# Patient Record
Sex: FEMALE | Race: WHITE | Employment: OTHER | URBAN - METROPOLITAN AREA
[De-identification: names, ages, dates, MRNs, and addresses within clinical notes are randomized per-mention and may not be internally consistent; named-entity substitution may affect disease eponyms.]

---

## 2021-02-02 DIAGNOSIS — Z11.59 ENCOUNTER FOR SCREENING FOR OTHER VIRAL DISEASES: Primary | ICD-10-CM

## 2021-03-11 ENCOUNTER — NURSE TRIAGE (OUTPATIENT)
Dept: NURSING | Facility: CLINIC | Age: 76
End: 2021-03-11

## 2021-03-11 NOTE — TELEPHONE ENCOUNTER
RN triage   Call from pt   Pt having hip surgery 3/19 and needs covid test before surgery   Pt wants covid test to be done at home clinic in [place]  Reviewed process -- to get covid test ordered and done at home clinic and ascertaining results are communicated to [facility] prior to surgery - or get test done at [facility]   Pt will decide where she wants test done and carrie back   Khloe Burdick RN  BAN  Triage Nurse Advisor    COVID 19 Nurse Triage Plan/Patient Instructions    Please be aware that novel coronavirus (COVID-19) may be circulating in the community. If you develop symptoms such as fever, cough, or SOB or if you have concerns about the presence of another infection including coronavirus (COVID-19), please contact your health care provider or visit [email or web address].     Disposition/Instructions    Home care recommended. Follow home care protocol based instructions.    Thank you for taking steps to prevent the spread of this virus.  o Limit your contact with others.  o Wear a simple mask to cover your cough.  o Wash your hands well and often.    Resources    [facility]: About COVID-19: [email or web address]    CDC: What to Do If You're Sick: www.cdc.gov/coronavirus/2019-ncov/about/steps-when-sick.html    CDC: Ending Home Isolation: www.cdc.gov/coronavirus/2019-ncov/hcp/disposition-in-home-patients.html     CDC: Caring for Someone: www.cdc.gov/coronavirus/2019-ncov/if-you-are-sick/care-for-someone.html     [facility]: Interim Guidance for Hospital Discharge to Home: www.health.state.[place]/diseases/coronavirus/hcp/hospdischarge.pdf    [facility] clinical trials (COVID-19 research studies): [email or web address]-[email or web address]     Below are the COVID-19 hotlines at the [facility] of [facility] ([facility]). Interpreters are available.   o For health questions: Call [phone] or [phone] (7 a.m. to 7 p.m.)  o For questions about schools and  childcare: Call [phone] or [phone] (7 a.m. to 7 p.m.)                     Reason for Disposition    COVID-19 Testing, questions about    Protocols used: CORONAVIRUS (COVID-19) DIAGNOSED OR [id number]-H-AZ 1.3

## 2021-03-18 RX ORDER — ASPIRIN 81 MG/1
81 TABLET ORAL DAILY
Status: ON HOLD | COMMUNITY
End: 2021-03-20

## 2021-03-18 RX ORDER — FLUOXETINE 40 MG/1
40 CAPSULE ORAL DAILY
COMMUNITY

## 2021-03-18 RX ORDER — BUSPIRONE HYDROCHLORIDE 7.5 MG/1
7.5 TABLET ORAL EVERY MORNING
COMMUNITY

## 2021-03-18 RX ORDER — SIMVASTATIN 20 MG
20 TABLET ORAL AT BEDTIME
COMMUNITY

## 2021-03-18 RX ORDER — TRAZODONE HYDROCHLORIDE 50 MG/1
50 TABLET, FILM COATED ORAL AT BEDTIME
COMMUNITY

## 2021-03-18 RX ORDER — IBUPROFEN 200 MG
200 TABLET ORAL EVERY 4 HOURS PRN
Status: ON HOLD | COMMUNITY
End: 2021-03-20

## 2021-03-18 RX ORDER — MULTIVIT-MIN/IRON/FOLIC ACID/K 18-600-40
500 CAPSULE ORAL DAILY
COMMUNITY

## 2021-03-18 RX ORDER — LEVOTHYROXINE SODIUM 75 UG/1
75 TABLET ORAL DAILY
COMMUNITY

## 2021-03-18 RX ORDER — VITAMIN B COMPLEX
1 TABLET ORAL DAILY
COMMUNITY

## 2021-03-18 RX ORDER — AMOXICILLIN 500 MG/1
2000 TABLET, FILM COATED ORAL DAILY PRN
COMMUNITY

## 2021-03-18 NOTE — PROGRESS NOTES
PTA medications updated by Medication Scribe prior to surgery via phone call with patient        Comments:    Medication history sources: Patient, Surescripts, H&P and Patient's home med list  Medication history source reliability: Good  Adherence assessment: N/A Not Observed    Significant changes made to the medication list:  None      Additional medication history information:   None    Prior to Admission medications    Medication Sig Last Dose Taking? Auth Provider   amoxicillin (AMOXIL) 500 MG tablet Take 2,000 mg by mouth daily as needed (1 hour prior to dental visits) MORE THAN 1 WEEK at PRN Yes Reported, Patient   Ascorbic Acid (VITAMIN C) 500 MG CAPS Take 500 mg by mouth daily 3/10/2021 Yes Reported, Patient   aspirin 81 MG EC tablet Take 81 mg by mouth daily 3/10/2021 Yes Reported, Patient   busPIRone (BUSPAR) 7.5 MG tablet Take 7.5 mg by mouth every morning   at AM Yes Reported, Patient   Flaxseed, Linseed, (FLAXSEED OIL PO) Take 1,000 mg by mouth daily 3/10/2021 Yes Reported, Patient   FLUoxetine (PROZAC) 40 MG capsule Take 40 mg by mouth daily 3/18/2021 at 1200 Yes Reported, Patient   hypromellose (ARTIFICIAL TEARS) 0.5 % SOLN ophthalmic solution Place 1 drop into both eyes every hour as needed for dry eyes  at PRN Yes Reported, Patient   ibuprofen (ADVIL/MOTRIN) 200 MG tablet Take 200 mg by mouth every 4 hours as needed for mild pain MORE THAN 1 WEEK at PRN Yes Reported, Patient   levothyroxine (SYNTHROID/LEVOTHROID) 75 MCG tablet Take 75 mcg by mouth daily  at AM Yes Reported, Patient   simvastatin (ZOCOR) 20 MG tablet Take 20 mg by mouth At Bedtime 3/18/2021 at HS Yes Reported, Patient   traZODone (DESYREL) 50 MG tablet Take 50 mg by mouth At Bedtime 3/18/2021 at HS Yes Reported, Patient   Vitamin D3 (CHOLECALCIFEROL) 25 mcg (1000 units) tablet Take 1 tablet by mouth daily 3/10/2021 Yes Reported, Patient

## 2021-03-19 ENCOUNTER — APPOINTMENT (OUTPATIENT)
Dept: GENERAL RADIOLOGY | Facility: CLINIC | Age: 76
DRG: 470 | End: 2021-03-19
Attending: ORTHOPAEDIC SURGERY
Payer: MEDICARE

## 2021-03-19 ENCOUNTER — APPOINTMENT (OUTPATIENT)
Dept: PHYSICAL THERAPY | Facility: CLINIC | Age: 76
DRG: 470 | End: 2021-03-19
Attending: ORTHOPAEDIC SURGERY
Payer: MEDICARE

## 2021-03-19 ENCOUNTER — ANESTHESIA EVENT (OUTPATIENT)
Dept: SURGERY | Facility: CLINIC | Age: 76
DRG: 470 | End: 2021-03-19
Payer: MEDICARE

## 2021-03-19 ENCOUNTER — ANESTHESIA (OUTPATIENT)
Dept: SURGERY | Facility: CLINIC | Age: 76
DRG: 470 | End: 2021-03-19
Payer: MEDICARE

## 2021-03-19 ENCOUNTER — HOSPITAL ENCOUNTER (INPATIENT)
Facility: CLINIC | Age: 76
LOS: 2 days | Discharge: HOME | DRG: 470 | End: 2021-03-23
Attending: ORTHOPAEDIC SURGERY | Admitting: ORTHOPAEDIC SURGERY
Payer: MEDICARE

## 2021-03-19 DIAGNOSIS — Z96.641 HISTORY OF TOTAL RIGHT HIP REPLACEMENT: Primary | ICD-10-CM

## 2021-03-19 DIAGNOSIS — D62 ANEMIA DUE TO BLOOD LOSS, ACUTE: ICD-10-CM

## 2021-03-19 PROBLEM — Z96.649 HISTORY OF TOTAL HIP REPLACEMENT: Status: ACTIVE | Noted: 2021-03-19

## 2021-03-19 LAB
ABO + RH BLD: NORMAL
ABO + RH BLD: NORMAL
BLD GP AB SCN SERPL QL: NORMAL
BLOOD BANK CMNT PATIENT-IMP: NORMAL
SPECIMEN EXP DATE BLD: NORMAL

## 2021-03-19 PROCEDURE — 86850 RBC ANTIBODY SCREEN: CPT | Performed by: ANESTHESIOLOGY

## 2021-03-19 PROCEDURE — 258N000003 HC RX IP 258 OP 636: Performed by: NURSE ANESTHETIST, CERTIFIED REGISTERED

## 2021-03-19 PROCEDURE — 36415 COLL VENOUS BLD VENIPUNCTURE: CPT | Performed by: ANESTHESIOLOGY

## 2021-03-19 PROCEDURE — 250N000011 HC RX IP 250 OP 636: Performed by: PHYSICIAN ASSISTANT

## 2021-03-19 PROCEDURE — 93005 ELECTROCARDIOGRAM TRACING: CPT

## 2021-03-19 PROCEDURE — 86901 BLOOD TYPING SEROLOGIC RH(D): CPT | Performed by: ANESTHESIOLOGY

## 2021-03-19 PROCEDURE — 93010 ELECTROCARDIOGRAM REPORT: CPT | Performed by: INTERNAL MEDICINE

## 2021-03-19 PROCEDURE — 86900 BLOOD TYPING SEROLOGIC ABO: CPT | Performed by: ANESTHESIOLOGY

## 2021-03-19 PROCEDURE — 258N000003 HC RX IP 258 OP 636: Performed by: PHYSICIAN ASSISTANT

## 2021-03-19 PROCEDURE — 370N000017 HC ANESTHESIA TECHNICAL FEE, PER MIN: Performed by: ORTHOPAEDIC SURGERY

## 2021-03-19 PROCEDURE — C1713 ANCHOR/SCREW BN/BN,TIS/BN: HCPCS | Performed by: ORTHOPAEDIC SURGERY

## 2021-03-19 PROCEDURE — 250N000011 HC RX IP 250 OP 636: Performed by: NURSE ANESTHETIST, CERTIFIED REGISTERED

## 2021-03-19 PROCEDURE — 0SR904Z REPLACEMENT OF RIGHT HIP JOINT WITH CERAMIC ON POLYETHYLENE SYNTHETIC SUBSTITUTE, OPEN APPROACH: ICD-10-PCS | Performed by: ORTHOPAEDIC SURGERY

## 2021-03-19 PROCEDURE — 258N000001 HC RX 258: Performed by: ORTHOPAEDIC SURGERY

## 2021-03-19 PROCEDURE — 710N000009 HC RECOVERY PHASE 1, LEVEL 1, PER MIN: Performed by: ORTHOPAEDIC SURGERY

## 2021-03-19 PROCEDURE — 258N000003 HC RX IP 258 OP 636: Performed by: ANESTHESIOLOGY

## 2021-03-19 PROCEDURE — 272N000001 HC OR GENERAL SUPPLY STERILE: Performed by: ORTHOPAEDIC SURGERY

## 2021-03-19 PROCEDURE — 0KQN0ZZ REPAIR RIGHT HIP MUSCLE, OPEN APPROACH: ICD-10-PCS | Performed by: ORTHOPAEDIC SURGERY

## 2021-03-19 PROCEDURE — 250N000013 HC RX MED GY IP 250 OP 250 PS 637: Performed by: PHYSICIAN ASSISTANT

## 2021-03-19 PROCEDURE — 999N000141 HC STATISTIC PRE-PROCEDURE NURSING ASSESSMENT: Performed by: ORTHOPAEDIC SURGERY

## 2021-03-19 PROCEDURE — 250N000009 HC RX 250: Performed by: ANESTHESIOLOGY

## 2021-03-19 PROCEDURE — 250N000009 HC RX 250: Performed by: NURSE ANESTHETIST, CERTIFIED REGISTERED

## 2021-03-19 PROCEDURE — 360N000077 HC SURGERY LEVEL 4, PER MIN: Performed by: ORTHOPAEDIC SURGERY

## 2021-03-19 PROCEDURE — C1776 JOINT DEVICE (IMPLANTABLE): HCPCS | Performed by: ORTHOPAEDIC SURGERY

## 2021-03-19 PROCEDURE — 999N000063 XR PELVIS PORT 1/2 VW

## 2021-03-19 PROCEDURE — 97530 THERAPEUTIC ACTIVITIES: CPT | Mod: GP

## 2021-03-19 PROCEDURE — 250N000011 HC RX IP 250 OP 636: Performed by: ANESTHESIOLOGY

## 2021-03-19 PROCEDURE — 97161 PT EVAL LOW COMPLEX 20 MIN: CPT | Mod: GP

## 2021-03-19 PROCEDURE — 250N000009 HC RX 250: Performed by: ORTHOPAEDIC SURGERY

## 2021-03-19 DEVICE — TRI-LOCK BPS FEMORAL STEM 12/14 TAPER TRI-LOCK BPS W/GRIPTION SIZE 4 HI 103MM
Type: IMPLANTABLE DEVICE | Site: HIP | Status: FUNCTIONAL
Brand: TRI-LOCK GRIPTION

## 2021-03-19 DEVICE — PINNACLE CANCELLOUS BONE SCREW 6.5MM X 25MM
Type: IMPLANTABLE DEVICE | Site: HIP | Status: FUNCTIONAL
Brand: PINNACLE

## 2021-03-19 DEVICE — BIOLOX DELTA CERAMIC FEMORAL HEAD +1.5 36MM DIA 12/14 TAPER
Type: IMPLANTABLE DEVICE | Site: HIP | Status: FUNCTIONAL
Brand: BIOLOX DELTA

## 2021-03-19 DEVICE — PINNACLE POROCOAT ACETABULAR SHELL SECTOR II 52MM OD
Type: IMPLANTABLE DEVICE | Site: HIP | Status: FUNCTIONAL
Brand: PINNACLE POROCOAT

## 2021-03-19 DEVICE — APEX HOLE ELIMINATOR - PS
Type: IMPLANTABLE DEVICE | Site: HIP | Status: FUNCTIONAL
Brand: APEX

## 2021-03-19 DEVICE — PINNACLE HIP SOLUTIONS ALTRX POLYETHYLENE ACETABULAR LINER +4 NEUTRAL 36MM ID 52MM OD
Type: IMPLANTABLE DEVICE | Site: HIP | Status: FUNCTIONAL
Brand: PINNACLE ALTRX

## 2021-03-19 RX ORDER — HYDROXYZINE HYDROCHLORIDE 10 MG/1
10 TABLET, FILM COATED ORAL EVERY 6 HOURS PRN
Status: DISCONTINUED | OUTPATIENT
Start: 2021-03-19 | End: 2021-03-23 | Stop reason: HOSPADM

## 2021-03-19 RX ORDER — FENTANYL CITRATE 50 UG/ML
INJECTION, SOLUTION INTRAMUSCULAR; INTRAVENOUS PRN
Status: DISCONTINUED | OUTPATIENT
Start: 2021-03-19 | End: 2021-03-19

## 2021-03-19 RX ORDER — DEXAMETHASONE SODIUM PHOSPHATE 4 MG/ML
INJECTION, SOLUTION INTRA-ARTICULAR; INTRALESIONAL; INTRAMUSCULAR; INTRAVENOUS; SOFT TISSUE PRN
Status: DISCONTINUED | OUTPATIENT
Start: 2021-03-19 | End: 2021-03-19

## 2021-03-19 RX ORDER — HYDROMORPHONE HYDROCHLORIDE 1 MG/ML
.3-.5 INJECTION, SOLUTION INTRAMUSCULAR; INTRAVENOUS; SUBCUTANEOUS EVERY 5 MIN PRN
Status: DISCONTINUED | OUTPATIENT
Start: 2021-03-19 | End: 2021-03-19 | Stop reason: HOSPADM

## 2021-03-19 RX ORDER — LIDOCAINE HYDROCHLORIDE 20 MG/ML
INJECTION, SOLUTION INFILTRATION; PERINEURAL PRN
Status: DISCONTINUED | OUTPATIENT
Start: 2021-03-19 | End: 2021-03-19

## 2021-03-19 RX ORDER — OXYCODONE HYDROCHLORIDE 5 MG/1
5 TABLET ORAL EVERY 4 HOURS PRN
Status: DISCONTINUED | OUTPATIENT
Start: 2021-03-19 | End: 2021-03-23 | Stop reason: HOSPADM

## 2021-03-19 RX ORDER — PROPOFOL 10 MG/ML
INJECTION, EMULSION INTRAVENOUS CONTINUOUS PRN
Status: DISCONTINUED | OUTPATIENT
Start: 2021-03-19 | End: 2021-03-19

## 2021-03-19 RX ORDER — HYDROMORPHONE HYDROCHLORIDE 1 MG/ML
0.4 INJECTION, SOLUTION INTRAMUSCULAR; INTRAVENOUS; SUBCUTANEOUS
Status: DISCONTINUED | OUTPATIENT
Start: 2021-03-19 | End: 2021-03-23 | Stop reason: HOSPADM

## 2021-03-19 RX ORDER — SIMVASTATIN 20 MG
20 TABLET ORAL AT BEDTIME
Status: DISCONTINUED | OUTPATIENT
Start: 2021-03-19 | End: 2021-03-23 | Stop reason: HOSPADM

## 2021-03-19 RX ORDER — DOCUSATE SODIUM 100 MG/1
100 CAPSULE, LIQUID FILLED ORAL 2 TIMES DAILY
Status: DISCONTINUED | OUTPATIENT
Start: 2021-03-19 | End: 2021-03-23 | Stop reason: HOSPADM

## 2021-03-19 RX ORDER — HYDROMORPHONE HCL IN WATER/PF 6 MG/30 ML
0.2 PATIENT CONTROLLED ANALGESIA SYRINGE INTRAVENOUS
Status: DISCONTINUED | OUTPATIENT
Start: 2021-03-19 | End: 2021-03-23 | Stop reason: HOSPADM

## 2021-03-19 RX ORDER — CEFAZOLIN SODIUM 2 G/100ML
2 INJECTION, SOLUTION INTRAVENOUS
Status: COMPLETED | OUTPATIENT
Start: 2021-03-19 | End: 2021-03-19

## 2021-03-19 RX ORDER — CEFAZOLIN SODIUM 1 G/3ML
1 INJECTION, POWDER, FOR SOLUTION INTRAMUSCULAR; INTRAVENOUS EVERY 8 HOURS
Status: COMPLETED | OUTPATIENT
Start: 2021-03-19 | End: 2021-03-20

## 2021-03-19 RX ORDER — PROCHLORPERAZINE MALEATE 5 MG
5 TABLET ORAL EVERY 6 HOURS PRN
Status: DISCONTINUED | OUTPATIENT
Start: 2021-03-19 | End: 2021-03-23 | Stop reason: HOSPADM

## 2021-03-19 RX ORDER — BISACODYL 10 MG
10 SUPPOSITORY, RECTAL RECTAL DAILY PRN
Status: DISCONTINUED | OUTPATIENT
Start: 2021-03-19 | End: 2021-03-23 | Stop reason: HOSPADM

## 2021-03-19 RX ORDER — ONDANSETRON 2 MG/ML
4 INJECTION INTRAMUSCULAR; INTRAVENOUS EVERY 30 MIN PRN
Status: DISCONTINUED | OUTPATIENT
Start: 2021-03-19 | End: 2021-03-19 | Stop reason: HOSPADM

## 2021-03-19 RX ORDER — CEFAZOLIN SODIUM 2 G/100ML
2 INJECTION, SOLUTION INTRAVENOUS SEE ADMIN INSTRUCTIONS
Status: DISCONTINUED | OUTPATIENT
Start: 2021-03-19 | End: 2021-03-19 | Stop reason: HOSPADM

## 2021-03-19 RX ORDER — ONDANSETRON 4 MG/1
4 TABLET, ORALLY DISINTEGRATING ORAL EVERY 6 HOURS PRN
Status: DISCONTINUED | OUTPATIENT
Start: 2021-03-19 | End: 2021-03-23 | Stop reason: HOSPADM

## 2021-03-19 RX ORDER — OXYCODONE HYDROCHLORIDE 5 MG/1
10 TABLET ORAL EVERY 4 HOURS PRN
Status: DISCONTINUED | OUTPATIENT
Start: 2021-03-19 | End: 2021-03-23 | Stop reason: HOSPADM

## 2021-03-19 RX ORDER — NALOXONE HYDROCHLORIDE 0.4 MG/ML
0.2 INJECTION, SOLUTION INTRAMUSCULAR; INTRAVENOUS; SUBCUTANEOUS
Status: ACTIVE | OUTPATIENT
Start: 2021-03-19 | End: 2021-03-20

## 2021-03-19 RX ORDER — AMOXICILLIN 250 MG
1 CAPSULE ORAL 2 TIMES DAILY
Status: DISCONTINUED | OUTPATIENT
Start: 2021-03-19 | End: 2021-03-23 | Stop reason: HOSPADM

## 2021-03-19 RX ORDER — ONDANSETRON 2 MG/ML
INJECTION INTRAMUSCULAR; INTRAVENOUS PRN
Status: DISCONTINUED | OUTPATIENT
Start: 2021-03-19 | End: 2021-03-19

## 2021-03-19 RX ORDER — KETOROLAC TROMETHAMINE 15 MG/ML
15 INJECTION, SOLUTION INTRAMUSCULAR; INTRAVENOUS EVERY 6 HOURS
Status: DISPENSED | OUTPATIENT
Start: 2021-03-19 | End: 2021-03-20

## 2021-03-19 RX ORDER — ONDANSETRON 2 MG/ML
4 INJECTION INTRAMUSCULAR; INTRAVENOUS EVERY 6 HOURS PRN
Status: DISCONTINUED | OUTPATIENT
Start: 2021-03-19 | End: 2021-03-23 | Stop reason: HOSPADM

## 2021-03-19 RX ORDER — ONDANSETRON 4 MG/1
4 TABLET, ORALLY DISINTEGRATING ORAL EVERY 30 MIN PRN
Status: DISCONTINUED | OUTPATIENT
Start: 2021-03-19 | End: 2021-03-19 | Stop reason: HOSPADM

## 2021-03-19 RX ORDER — FENTANYL CITRATE 50 UG/ML
25-50 INJECTION, SOLUTION INTRAMUSCULAR; INTRAVENOUS
Status: DISCONTINUED | OUTPATIENT
Start: 2021-03-19 | End: 2021-03-19 | Stop reason: HOSPADM

## 2021-03-19 RX ORDER — SODIUM CHLORIDE, SODIUM LACTATE, POTASSIUM CHLORIDE, CALCIUM CHLORIDE 600; 310; 30; 20 MG/100ML; MG/100ML; MG/100ML; MG/100ML
INJECTION, SOLUTION INTRAVENOUS CONTINUOUS
Status: DISCONTINUED | OUTPATIENT
Start: 2021-03-19 | End: 2021-03-19 | Stop reason: HOSPADM

## 2021-03-19 RX ORDER — LEVOTHYROXINE SODIUM 75 UG/1
75 TABLET ORAL
Status: DISCONTINUED | OUTPATIENT
Start: 2021-03-20 | End: 2021-03-23 | Stop reason: HOSPADM

## 2021-03-19 RX ORDER — VITAMIN B COMPLEX
25 TABLET ORAL DAILY
Status: DISCONTINUED | OUTPATIENT
Start: 2021-03-19 | End: 2021-03-23 | Stop reason: HOSPADM

## 2021-03-19 RX ORDER — POLYETHYLENE GLYCOL 3350 17 G/17G
17 POWDER, FOR SOLUTION ORAL DAILY
Status: DISCONTINUED | OUTPATIENT
Start: 2021-03-20 | End: 2021-03-23 | Stop reason: HOSPADM

## 2021-03-19 RX ORDER — PROPOFOL 10 MG/ML
INJECTION, EMULSION INTRAVENOUS PRN
Status: DISCONTINUED | OUTPATIENT
Start: 2021-03-19 | End: 2021-03-19

## 2021-03-19 RX ORDER — NALOXONE HYDROCHLORIDE 0.4 MG/ML
0.4 INJECTION, SOLUTION INTRAMUSCULAR; INTRAVENOUS; SUBCUTANEOUS
Status: ACTIVE | OUTPATIENT
Start: 2021-03-19 | End: 2021-03-20

## 2021-03-19 RX ORDER — TRAZODONE HYDROCHLORIDE 50 MG/1
50 TABLET, FILM COATED ORAL AT BEDTIME
Status: DISCONTINUED | OUTPATIENT
Start: 2021-03-19 | End: 2021-03-23 | Stop reason: HOSPADM

## 2021-03-19 RX ORDER — MAGNESIUM HYDROXIDE 1200 MG/15ML
LIQUID ORAL PRN
Status: DISCONTINUED | OUTPATIENT
Start: 2021-03-19 | End: 2021-03-19 | Stop reason: HOSPADM

## 2021-03-19 RX ORDER — SODIUM CHLORIDE, SODIUM LACTATE, POTASSIUM CHLORIDE, CALCIUM CHLORIDE 600; 310; 30; 20 MG/100ML; MG/100ML; MG/100ML; MG/100ML
INJECTION, SOLUTION INTRAVENOUS CONTINUOUS
Status: DISCONTINUED | OUTPATIENT
Start: 2021-03-19 | End: 2021-03-23

## 2021-03-19 RX ORDER — TRANEXAMIC ACID 650 MG/1
1950 TABLET ORAL ONCE
Status: DISCONTINUED | OUTPATIENT
Start: 2021-03-19 | End: 2021-03-19 | Stop reason: HOSPADM

## 2021-03-19 RX ORDER — LIDOCAINE 40 MG/G
CREAM TOPICAL
Status: DISCONTINUED | OUTPATIENT
Start: 2021-03-19 | End: 2021-03-23 | Stop reason: HOSPADM

## 2021-03-19 RX ADMIN — DOCUSATE SODIUM 100 MG: 100 CAPSULE, LIQUID FILLED ORAL at 14:16

## 2021-03-19 RX ADMIN — SIMVASTATIN 20 MG: 20 TABLET, FILM COATED ORAL at 22:05

## 2021-03-19 RX ADMIN — DOCUSATE SODIUM 100 MG: 100 CAPSULE, LIQUID FILLED ORAL at 22:05

## 2021-03-19 RX ADMIN — SENNOSIDES AND DOCUSATE SODIUM 1 TABLET: 8.6; 5 TABLET ORAL at 22:05

## 2021-03-19 RX ADMIN — LIDOCAINE HYDROCHLORIDE 60 MG: 20 INJECTION, SOLUTION INFILTRATION; PERINEURAL at 07:30

## 2021-03-19 RX ADMIN — HYDROMORPHONE HYDROCHLORIDE 0.5 MG: 1 INJECTION, SOLUTION INTRAMUSCULAR; INTRAVENOUS; SUBCUTANEOUS at 09:42

## 2021-03-19 RX ADMIN — SUGAMMADEX 160 MG: 100 INJECTION, SOLUTION INTRAVENOUS at 10:09

## 2021-03-19 RX ADMIN — KETOROLAC TROMETHAMINE 15 MG: 15 INJECTION, SOLUTION INTRAMUSCULAR; INTRAVENOUS at 14:16

## 2021-03-19 RX ADMIN — HYDROXYZINE HYDROCHLORIDE 10 MG: 10 TABLET, FILM COATED ORAL at 15:48

## 2021-03-19 RX ADMIN — PROPOFOL 40 MG: 10 INJECTION, EMULSION INTRAVENOUS at 09:57

## 2021-03-19 RX ADMIN — FENTANYL CITRATE 50 MCG: 50 INJECTION, SOLUTION INTRAMUSCULAR; INTRAVENOUS at 07:36

## 2021-03-19 RX ADMIN — KETOROLAC TROMETHAMINE 15 MG: 15 INJECTION, SOLUTION INTRAMUSCULAR; INTRAVENOUS at 20:09

## 2021-03-19 RX ADMIN — CEFAZOLIN SODIUM 1 G: 2 INJECTION, SOLUTION INTRAVENOUS at 09:43

## 2021-03-19 RX ADMIN — OXYCODONE HYDROCHLORIDE 5 MG: 5 TABLET ORAL at 17:13

## 2021-03-19 RX ADMIN — ROCURONIUM BROMIDE 10 MG: 10 INJECTION INTRAVENOUS at 08:33

## 2021-03-19 RX ADMIN — CEFAZOLIN 1 G: 330 INJECTION, POWDER, FOR SOLUTION INTRAMUSCULAR; INTRAVENOUS at 17:13

## 2021-03-19 RX ADMIN — HYDROMORPHONE HYDROCHLORIDE 0.4 MG: 1 INJECTION, SOLUTION INTRAMUSCULAR; INTRAVENOUS; SUBCUTANEOUS at 12:28

## 2021-03-19 RX ADMIN — FLUOXETINE 40 MG: 20 CAPSULE ORAL at 14:15

## 2021-03-19 RX ADMIN — HYDROMORPHONE HYDROCHLORIDE 0.5 MG: 1 INJECTION, SOLUTION INTRAMUSCULAR; INTRAVENOUS; SUBCUTANEOUS at 10:52

## 2021-03-19 RX ADMIN — Medication 25 MCG: at 14:16

## 2021-03-19 RX ADMIN — DEXMEDETOMIDINE HYDROCHLORIDE 4 MCG: 100 INJECTION, SOLUTION INTRAVENOUS at 08:40

## 2021-03-19 RX ADMIN — ONDANSETRON 4 MG: 2 INJECTION INTRAMUSCULAR; INTRAVENOUS at 09:15

## 2021-03-19 RX ADMIN — ROCURONIUM BROMIDE 20 MG: 10 INJECTION INTRAVENOUS at 08:07

## 2021-03-19 RX ADMIN — SODIUM CHLORIDE, POTASSIUM CHLORIDE, SODIUM LACTATE AND CALCIUM CHLORIDE: 600; 310; 30; 20 INJECTION, SOLUTION INTRAVENOUS at 14:16

## 2021-03-19 RX ADMIN — CEFAZOLIN SODIUM 2 G: 2 INJECTION, SOLUTION INTRAVENOUS at 07:43

## 2021-03-19 RX ADMIN — PROPOFOL 160 MG: 10 INJECTION, EMULSION INTRAVENOUS at 07:30

## 2021-03-19 RX ADMIN — DEXMEDETOMIDINE HYDROCHLORIDE 4 MCG: 100 INJECTION, SOLUTION INTRAVENOUS at 07:52

## 2021-03-19 RX ADMIN — HYDROMORPHONE HYDROCHLORIDE 0.5 MG: 1 INJECTION, SOLUTION INTRAMUSCULAR; INTRAVENOUS; SUBCUTANEOUS at 08:00

## 2021-03-19 RX ADMIN — SENNOSIDES AND DOCUSATE SODIUM 1 TABLET: 8.6; 5 TABLET ORAL at 14:15

## 2021-03-19 RX ADMIN — OXYCODONE HYDROCHLORIDE 10 MG: 5 TABLET ORAL at 22:04

## 2021-03-19 RX ADMIN — DEXAMETHASONE SODIUM PHOSPHATE 4 MG: 4 INJECTION, SOLUTION INTRA-ARTICULAR; INTRALESIONAL; INTRAMUSCULAR; INTRAVENOUS; SOFT TISSUE at 07:44

## 2021-03-19 RX ADMIN — LIDOCAINE HYDROCHLORIDE 0.3 ML: 10 INJECTION, SOLUTION EPIDURAL; INFILTRATION; INTRACAUDAL; PERINEURAL at 07:18

## 2021-03-19 RX ADMIN — DEXMEDETOMIDINE HYDROCHLORIDE 4 MCG: 100 INJECTION, SOLUTION INTRAVENOUS at 08:19

## 2021-03-19 RX ADMIN — FENTANYL CITRATE 50 MCG: 50 INJECTION, SOLUTION INTRAMUSCULAR; INTRAVENOUS at 07:30

## 2021-03-19 RX ADMIN — DEXMEDETOMIDINE HYDROCHLORIDE 4 MCG: 100 INJECTION, SOLUTION INTRAVENOUS at 07:47

## 2021-03-19 RX ADMIN — PROPOFOL 40 MG: 10 INJECTION, EMULSION INTRAVENOUS at 07:55

## 2021-03-19 RX ADMIN — PHENYLEPHRINE HYDROCHLORIDE 50 MCG: 10 INJECTION INTRAVENOUS at 09:30

## 2021-03-19 RX ADMIN — SODIUM CHLORIDE, POTASSIUM CHLORIDE, SODIUM LACTATE AND CALCIUM CHLORIDE: 600; 310; 30; 20 INJECTION, SOLUTION INTRAVENOUS at 09:35

## 2021-03-19 RX ADMIN — SODIUM CHLORIDE, POTASSIUM CHLORIDE, SODIUM LACTATE AND CALCIUM CHLORIDE: 600; 310; 30; 20 INJECTION, SOLUTION INTRAVENOUS at 07:19

## 2021-03-19 RX ADMIN — PROPOFOL 120 MCG/KG/MIN: 10 INJECTION, EMULSION INTRAVENOUS at 07:30

## 2021-03-19 RX ADMIN — PROPOFOL 40 MG: 10 INJECTION, EMULSION INTRAVENOUS at 09:41

## 2021-03-19 RX ADMIN — ROCURONIUM BROMIDE 50 MG: 10 INJECTION INTRAVENOUS at 07:30

## 2021-03-19 ASSESSMENT — LIFESTYLE VARIABLES: TOBACCO_USE: 0

## 2021-03-19 ASSESSMENT — MIFFLIN-ST. JEOR: SCORE: 1340.03

## 2021-03-19 NOTE — BRIEF OP NOTE
[facility]    Brief Operative Note    Pre-operative diagnosis: Primary osteoarthritis of right hip [M16.11]  Post-operative diagnosis Same as pre-operative diagnosis    Procedure: Procedure(s):  RIGHT ABDUCTOR REPAIR  RIGHT TOTAL HIP ARTHROPLASTY  Surgeon: Surgeon(s) and Role:     * Gloria Hannon MD - Primary     * Víctor Hopkins PA-C - Assisting  Anesthesia: General   Estimated blood loss: Less than 100 ml  Drains: None  Specimens: * No specimens in log *  Findings:   None.  Complications: None.  Implants:   Implant Name Type Inv. Item Serial No.  Lot No. LRB No. Used Action   IMP APEX HOLE ELIMINATOR HIP DEPUY DURALOC 1246- Metallic Hardware/Colver IMP APEX HOLE ELIMINATOR HIP DEPUY DURALOC 1246-  [facility] [facility] [id number] Right 1 Implanted   IMP CUP ACE PINNACLE 52MM 1217- Total Joint Component/Insert IMP CUP ACE PINNACLE 52MM 1217-  J [id number] Right 1 Implanted   IMP SCR BONE CAN ACE 6.5X25MM 1217- Metallic Hardware/Colver IMP SCR BONE CAN ACE 6.5X25MM 1217-  J [employer] [facility] [id number] Right 1 Implanted   IMP SCR BONE CAN ACE 6.5X25MM 1217- Metallic Hardware/Colver IMP SCR BONE CAN ACE 6.5X25MM 1217-  J& [employer] [facility] [id number] Right 1 Implanted   LINER ACETABULAR ALTRX +4 NEUT [id number] Total Joint Component/Insert LINER ACETABULAR ALTRX +4 NEUT [id number]  J [id number] Right 1 Implanted   STEM FEMUR TRI-LOCK BPS SZ 4 HO Total Joint Component/Insert STEM FEMUR TRI-LOCK BPS SZ 4 HO  J [id number] Right 1 Implanted   IMP HEAD FEMORAL DEPUY CERAMIC 36MM +1.5MM 1365- Total Joint Component/Insert IMP HEAD FEMORAL DEPUY CERAMIC 36MM +1.5MM 1365-  J [id number] Right 1 Implanted

## 2021-03-19 NOTE — ANESTHESIA CARE TRANSFER NOTE
Patient: Nicole Lima    Procedure(s):  RIGHT ABDUCTOR REPAIR  RIGHT TOTAL HIP ARTHROPLASTY    Diagnosis: Primary osteoarthritis of right hip [M16.11]  Diagnosis Additional Information: No value filed.    Anesthesia Type:   General     Note:    Oropharynx: oropharynx clear of all foreign objects  Level of Consciousness: awake  Oxygen Supplementation: face mask  Level of Supplemental Oxygen (L/min / FiO2): 6  Independent Airway: airway patency satisfactory and stable  Dentition: dentition unchanged  Vital Signs Stable: post-procedure vital signs reviewed and stable  Report to RN Given: handoff report given  Patient transferred to: PACU    Handoff Report: Identifed the Patient, Identified the Reponsible Provider, Reviewed the pertinent medical history, Discussed the surgical course, Reviewed Intra-OP anesthesia mangement and issues during anesthesia, Set expectations for post-procedure period and Allowed opportunity for questions and acknowledgement of understanding      Vitals: (Last set prior to Anesthesia Care Transfer)  CRNA VITALS  3/19/2021 0944 - 3/19/2021 1021      3/19/2021             Resp Rate (set):  10        Electronically Signed By: KENNEY Martin CRNA  March 19, 2021  10:21 AM

## 2021-03-19 NOTE — OP NOTE
Procedure Date: 03/19/2021      PREOPERATIVE DIAGNOSIS:  Osteoarthritis of the right hip and abductor tear, right hip.      POSTOPERATIVE DIAGNOSIS:  Osteoarthritis of the right hip and abductor tear, right hip.      PROCEDURE:  Right total hip arthroplasty and abductor repair.      INDICATIONS FOR PROCEDURE:  The patient is a 75-year-old female who has had a previous left total hip arthroplasty that was done at the [facility].  She had difficulty with ambulation after that and was found to have an adductor tear on the left side.  The [facility] refused to repair this.  I saw the patient in consultation and eventually did an abductor repair on her left hip.  She was noted to have an abductor tear in her right hip and osteoarthritis of the right hip.  This was all confirmed by an MRI.  I discussed treatment options with respect to her right hip and abductor tear and recommended right total hip arthroplasty and abductor repair.  I explained the risks, benefits, and potential complications of the surgery with the patient.  Discussion included but not specific to infection, vascular and neurologic complications, DVT, leg length inequality, instability of the hip, fracture, septic and aseptic loosening, heterotopic ossification, the possible need for further revision surgery.  After this discussion, the patient wanted to proceed with surgery.      ANESTHESIA:  General.      ASSISTANT:  Víctor Hopkins PA-C      DESCRIPTION OF PROCEDURE:  The patient was taken to the operating room and placed on the operating table in the supine position.  After adequate induction of a general anesthetic, he was transferred to lateral position and held this way with a Acevedo hip maldonado, held with the right hip up.  Axillary roll was placed.  The patient was given 2 grams of Ancef for infection prophylaxis given 1 hour prior to incision.  We then performed sterile prep and drape of the right hip and right lower extremity.  After sterile  prep and drape, the decision was made to proceed with an anterolateral approach to the hip.  She had extensive trochanteric bursitis.  The bursa was excised.  It was found to have a complete full-thickness tear of the abductors, involving 75-80% of the abductors, including the gluteus medius and minimus.  These abductors were tagged and released in order to get back to original length.  We then did a T capsulotomy, preserving the capsule for later repair.  We were then able to dislocate the hip.  Upon dislocation of the hip, we made appropriate femoral neck cut and applied bone wax to the cut surface of femur to diminish blood loss.  We then placed retractors anteriorly, superiorly and posteriorly to expose the acetabulum.  We then reamed to 51 mm and eventually put in a 52 mm cup.  Prior to placing the cup, we took reamings of the femoral head to use as bone graft.  Once the cup was fully seated, 2 screws were applied for additional fixation.  We then applied a manhole cover followed by a +4 neutral liner for a 52 mm cup and a 36 mm head.  We then directed our attention to the femur, used a broach-only technique, broached to a size 4 high-offset Tri-Lock stem, reduced the hip and found it to be very stable in full extension, external rotation, flexion, adduction, internal rotation with restoration of leg length and offset.  We then obtained intraoperative AP pelvis x-ray, which showed ideal position of the cup and screws with good fit on the femoral side with restoration of leg length and offset.  We then dislocated the hip, removed the trial components, irrigated with pulse jet lavage and put down the actual stem.  Once the stem was fully seated, we then applied a ceramic 36 mm head with a +1.5 neck, reduced the hip and again found it to be very stable with restoration of leg length and offset.  We then soaked the hip in a dilute solution of Betadine for 3 minutes and irrigated with pulse jet lavage.  We used  approximately 3 liters of antibiotic saline and pulse jet lavage.  We then repaired the capsule using 0 Ethibond sutures.  I then used the Concept rotator cuff repair kit to make bony tunnels in order to repair the abductors.  We created a footprint using a rongeur and used #2 Ethibond sutures to repair the abductors through the bony tunnels, and this was oversewn using 0 Ethibond.  This allowed for a very nice rigid repair.  We took the hip through a range of motion, and the repair remained intact.  We then irrigated and repaired the fascial layer using 0 Ethibond, and it was oversewn using 0 Stratafix.  The deep subcutaneous was closed using 0 Vicryl; superficial subcutaneous was closed with 2-0 Vicryl; skin was closed with a running 3-0 Monocryl subcuticular stitch followed by Prineo dressing.  Sterile dressing was applied followed by an abduction pillow.  The patient tolerated the operative procedure.  There were no intraoperative complications.  The patient went to PAR in stable condition.      PLAN:  Will be for the patient to get 24 hours of Ancef for infection prophylaxis, 30 days of aspirin for DVT prophylaxis and she will be allowed to weightbear as tolerated in an abduction brace for the next 2-3 months.         JERRELL SHORT MD             D: 2021   T: 2021   MT: CHARLIE      Name:     EM MARIN   MRN:      [id number]        Account:        [id number]   :      1945           Procedure Date: 2021      Document: [id number]

## 2021-03-19 NOTE — ANESTHESIA PROCEDURE NOTES
Airway       Patient location during procedure: OR       Procedure Start/Stop Times: 3/19/2021 7:33 AM  Staff -        Anesthesiologist:  Dipak Blum MD       CRNA: Teresa Mora APRN CRNA       Performed By: SRNAIndications and Patient Condition       Indications for airway management: evangelist-procedural       Induction type:intravenous       Mask difficulty assessment: 2 - vent by mask + OA or adjuvant +/- NMBA    Final Airway Details       Final airway type: endotracheal airway       Successful airway: ETT - single and Oral  Endotracheal Airway Details        ETT size (mm): 6.5       Successful intubation technique: direct laryngoscopy       DL Blade Type: Fuller 2       Grade View of Cords: 1       Adjucts: stylet and tooth guard       Position: Right       Measured from: gums/teeth       Secured at (cm): 22       Bite block used: None    Post intubation assessment        Number of attempts at approach: 1       Number of other approaches attempted: 0       Secured with: pink tape       Ease of procedure: easy       Dentition: Intact and Unchanged    Medication(s) Administered   Medication Administration Time: 3/19/2021 7:33 AM    Additional Comments       Bess QUINONES

## 2021-03-19 NOTE — ANESTHESIA PREPROCEDURE EVALUATION
Anesthesia Pre-Procedure Evaluation    Patient: Nicole Lima   MRN: [id number] : 1945        Preoperative Diagnosis: Primary osteoarthritis of right hip [M16.11]   Procedure : Procedure(s):  RIGHT ABDUCTOR REPAIR  RIGHT TOTAL HIP ARTHROPLASTY     Past Medical History:   Diagnosis Date     Anxiety      Arthritis     primary OA bilat hip     Ascending aorta dilatation (H)      Chronic low back pain      Degenerative lumbar disc      Depression, major, recurrent, in partial remission (H)      Dermatochalasis of both lower eyelids     and left upper eyelid     Diverticulosis      DVT (deep venous thrombosis) (H)      Dyspnea     multifactorial     Enthesopathy of hip     right     GERD (gastroesophageal reflux disease)      Hyperlipidemia      Hypothyroidism      Insomnia      Interstitial cystitis      Keratosis, actinic      Macrocytosis      Myalgia      Plantar neuroma of left foot      PONV (postoperative nausea and vomiting)      Pulmonary embolus (H)      Sensorineural hearing loss      Tendinopathy of left gluteus medius      Trochanteric bursitis, right hip      Venous insufficiency (chronic) (peripheral)       Past Surgical History:   Procedure Laterality Date     ABDOMEN SURGERY  2003    laparoscopy with fulguration r excision of lesions of the ovary, pelvic viscera or peritoneal surface     APPENDECTOMY  1965     BREAST SURGERY  2006    biopsy x 3     COLONOSCOPY  2014    with poypectomy     ENT SURGERY  05/15/2017    reduction of nasal turbinate     ENT SURGERY      tonisllectomy     EYE SURGERY      intraocular lens implant     EYE SURGERY  2018    blepharoplasty     GENITOURINARY SURGERY  ,     bladder repair     GYN SURGERY  1978    hysterectomy, salpingo oopherectomy     HERNIA REPAIR  1975    umbilical     ORTHOPEDIC SURGERY Left 2019    JABIER     ORTHOPEDIC SURGERY  2018    excision of berman's neroma     ORTHOPEDIC SURGERY  10/20/2017     exostectomy     ORTHOPEDIC SURGERY  08/24/2004    excision of berman's neuroma, correctin of hallux valgus     ORTHOPEDIC SURGERY  11/1996    excion of berman's neuroma     ORTHOPEDIC SURGERY  10/2012    excision of berman's neuroma     ORTHOPEDIC SURGERY  03/09/2018    removal of hardware second metatarsal     ORTHOPEDIC SURGERY  04/07/2013    hammer toe correction     ORTHOPEDIC SURGERY  11/17/2011    arthroplasty of wrist and hand     ORTHOPEDIC SURGERY  10/19/2012    repair of joint capsule     ORTHOPEDIC SURGERY  04/05/2013    bunionectomy     ORTHOPEDIC SURGERY  11/12/1994    repair of hallux valgus     ORTHOPEDIC SURGERY  10/20/2017    chellectomy of metatarsal      No Known Allergies   Social History     Tobacco Use     Smoking status: Not on file   Substance Use Topics     Alcohol use: Not on file      Wt Readings from Last 1 Encounters:   No data found for Wt        Anesthesia Evaluation   Pt has had prior anesthetic. Type: General.    History of anesthetic complications  - PONV.      ROS/MED HX  ENT/Pulmonary:    (-) tobacco use   Neurologic:       Cardiovascular: Comment: Ascending aortic dilation    (+) Dyslipidemia -Peripheral Vascular Disease----Previous cardiac testing   Echo: Date: 5/26/20 Results:  LEFT VENTRICLE:  Normal left ventricular chamber size. Normal left ventricular wall thickness. Calculated 2-D linear left ventricular ejection fraction 61 %. Normal left ventricular filling pressure.    RIGHT VENTRICLE:  Normal right ventricular chamber size. Normal right ventricular systolic function. Estimated right ventricular systolic pressure 27 mmHg.    ATRIA:  Normal left atrial size. Normal right atrial size.    CARDIAC VALVES:  Trileaflet aortic valve. Normal aortic valve. No aortic valve regurgitation. Normal mitral valve. Trivial mitral valve regurgitation. Normal pulmonary valve. Trivial pulmonary valve regurgitation. Normal tricuspid valve. Mild tricuspid valve regurgitation.    OTHER  ECHO FINDINGS:  Borderline enlarged proximal ascending aorta diameter (diameter 39 mm at proximal level) (upper limit of normal for age, sex, and BSA is 39.3 mm). No intracardiac mass or thrombus, but the left atrial appendage cannot be visualized adequately with transthoracic echo to exclude thrombus in this location. No pericardial effusion.  Stress Test: Date: 5/26/20 Results:  The patient exercised for 6:09 min:sec on the Jay protocol. The patient achieved a workload of 7.2 METS and 102 % FAC. The patient's exercise capacity was average. A peak heart rate of 153 BPM was achieved (104 % age-predicted maximal HR). Blood pressure at rest 140 mmHg/78 mmHg. Blood pressure with exercise 164 mmHg/80 mmHg. Normal blood pressure response to exercise. The test was terminated due to fatigue. The baseline ECG demonstrated sinus rhythm. With stress, there were no ST changes. The patient had no dyspnea and no discomfort (rated 0 on a scale of 0-10) in chest or other locations typical for cardiac ischemia before, during or after stress. The stress ECG was negative for ischemia  ECG Reviewed: Date: 3/27/20 Results:  SR w/ PVC and prolonged QT, per report  Cath:  Date: Results:   (-) taking anticoagulants/antiplatelets   METS/Exercise Tolerance:     Hematologic:     (+) History of blood clots (DVT and PE), pt is not anticoagulated,     Musculoskeletal:   (+) arthritis,     GI/Hepatic:     (+) GERD,     Renal/Genitourinary:       Endo:     (+) thyroid problem, hypothyroidism,     Psychiatric/Substance Use:     (+) psychiatric history anxiety and depression     Infectious Disease:       Malignancy:       Other:      (+) , H/O Chronic Pain (Interstitial cystitis),           OUTSIDE LABS:  CBC: No results found for: WBC, HGB, HCT, PLT  BMP: No results found for: NA, POTASSIUM, CHLORIDE, CO2, BUN, CR, GLC  COAGS: No results found for: PTT, INR, FIBR  POC: No results found for: BGM, HCG, HCGS  HEPATIC: No results found for: ALBUMIN,  PROTTOTAL, ALT, AST, GGT, ALKPHOS, BILITOTAL, BILIDIRECT, MARCELO  OTHER: No results found for: PH, LACT, A1C, CULLEN, PHOS, MAG, LIPASE, AMYLASE, TSH, T4, T3, CRP, SED    Anesthesia Plan    ASA Status:  3      Anesthesia Type: General.   Induction: Intravenous.   Maintenance: Balanced.        Consents    Anesthesia Plan(s) and associated risks, benefits, and realistic alternatives discussed. Questions answered and patient/representative(s) expressed understanding.     - Discussed with:  Patient         Postoperative Care    Pain management: IV analgesics, Multi-modal analgesia.   PONV prophylaxis: Ondansetron (or other 5HT-3), Dexamethasone or Solumedrol, Background Propofol Infusion     Comments:    Precedex gtt            Dipak Blum MD

## 2021-03-19 NOTE — OR NURSING
1110hr - JESSICA Kulwant rounded on pt; pt awake and stable.  Altered rt hip skin area highlighted and JESSICA asked that Surgeon be notified.  1115hr - MD Hannon now rounding on Pt; altered skin distal to right hip noted.  V.O: Apply small mepilex to area and continue to monitor site closely per MD Hannon.

## 2021-03-19 NOTE — ANESTHESIA POSTPROCEDURE EVALUATION
Patient: Nicole Lima    Procedure(s):  RIGHT ABDUCTOR REPAIR  RIGHT TOTAL HIP ARTHROPLASTY    Diagnosis:Primary osteoarthritis of right hip [M16.11]  Diagnosis Additional Information: No value filed.    Anesthesia Type:  General    Note:     Postop Pain Control: Uneventful            Sign Out: Well controlled pain   PONV: No   Neuro/Psych: Uneventful            Sign Out: Acceptable/Baseline neuro status   Airway/Respiratory: Uneventful            Sign Out: Acceptable/Baseline resp. status   CV/Hemodynamics: Uneventful            Sign Out: Acceptable CV status   Other NRE: NONE   DID A NON-ROUTINE EVENT OCCUR? No         Last vitals:  Vitals:    03/19/21 1030 03/19/21 1040 03/19/21 1050   BP: 96/64 94/65 105/62   Pulse: 56 56 56   Resp: 16 16 16   Temp:  36.7  C (98  F)    SpO2: 100% 100% 100%       Last vitals prior to Anesthesia Care Transfer:  CRNA VITALS  3/19/2021 0944 - 3/19/2021 1044      3/19/2021             Resp Rate (set):  10          Electronically Signed By: iDpak Blum MD  March 19, 2021  11:17 AM

## 2021-03-19 NOTE — PROGRESS NOTES
03/19/21 1550   Quick Adds   Quick Adds Certification   Type of Visit Initial PT Evaluation   Living Environment   People in home spouse   Current Living Arrangements house   Home Accessibility stairs to enter home;stairs within home   Number of Stairs, Main Entrance 2   Stair Railings, Main Entrance railings safe and in good condition   Number of Stairs, Within Home, Primary   (1 flight)   Stair Railings, Within Home, Primary railings safe and in good condition   Transportation Anticipated family or friend will provide   Living Environment Comments Granddaughter planning to stay with pt and her  to help.    Self-Care   Usual Activity Tolerance moderate   Current Activity Tolerance poor   Equipment Currently Used at Home walker, rolling  (hip abductor brace)   Activity/Exercise/Self-Care Comment Manisha PLOF, limited activity tolerance.    Disability/Function   Walking or Climbing Stairs Difficulty yes   Dressing/Bathing Difficulty no   Toileting issues no   Fall history within last six months no   Change in Functional Status Since Onset of Current Illness/Injury yes   General Information   Onset of Illness/Injury or Date of Surgery 03/19/21   Referring Physician Víctor Hopkins PA-C   Patient/Family Therapy Goals Statement (PT) To do well, to get home.    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    Existing Precautions/Restrictions no active hip ABD;fall;other (see comments)   Weight-Bearing Status - LUE full weight-bearing  (all)   General Observations Brace at all times.   Cognition   Affect/Mental Status (Cognition) WFL   Follows Commands (Cognition) WFL   Pain Assessment   Patient Currently in Pain Yes, see Vital Sign flowsheet  (team aware)   Posture    Posture Comments Impaired controlled mobility   Range of Motion (ROM)   ROM Comment Impaired R LE per  precautions & pain.    Strength   Strength Comments Functionally limited R LE given pain & precautions.    Bed Mobility   Comment (Bed Mobility) Rita sit to/from supine for R LE assist and technique/sequencing.    Transfers   Transfer Safety Comments Sit-stand with training on hand placement, R foot kick out for pain management PRN. Pivot CGA RW offloading R LE.    Gait/Stairs (Locomotion)   Distance in Feet (Required for LE Total Joints) 8' RW hip abductor brace GB CGA with PT helping pace and cue offloading with step-to. LImited by lightheadedness: BP supine 93/58, sitting 112/57, post-amb seated /60, HR & O2 stable.   Comment (Gait/Stairs) Not yet ready.   Balance   Balance Comments CGA dynamic mobility.   Coordination   Coordination Comments WFL   Muscle Tone   Muscle Tone Comments WFL   Clinical Impression   Criteria for Skilled Therapeutic Intervention yes, treatment indicated   PT Diagnosis (PT) IMpaired mobility   Influenced by the following impairments Impaired strength, ROM, balance, activity tolerance.    Functional limitations due to impairments Impaired independent mobility & living   Clinical Presentation Stable/Uncomplicated   Clinical Decision Making (Complexity) low complexity   Therapy Frequency (PT) 2x/day   Predicted Duration of Therapy Intervention (days/wks) 2 days   Planned Therapy Interventions (PT) balance training;bed mobility training;gait training;home exercise program;motor coordination training;neuromuscular re-education;patient/family education;postural re-education;stair training;strengthening;transfer training   Anticipated Equipment Needs at Discharge (PT) walker, rolling;wheelchair;gait belt;shower chair   Risk & Benefits of therapy have been explained evaluation/treatment results reviewed;care plan/treatment goals reviewed;risks/benefits reviewed;current/potential barriers reviewed;participants voiced agreement with care plan;spouse/significant other;participants  included;patient;caregiver   PT Discharge Planning    PT Discharge Recommendation (DC Rec)   (Per surgeon)   PT Rationale for DC Rec Pt currently with limited gait, not yet able to do stairs given: pain and spasms, lightheadedness (vitals stable). PT to see in AM to try and progress - pt has stairs to enter her home where she'll have 24hr assist.    PT Brief overview of current status  Bed mob & gait Josesito RW & hip abductor brace.    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

## 2021-03-20 ENCOUNTER — APPOINTMENT (OUTPATIENT)
Dept: PHYSICAL THERAPY | Facility: CLINIC | Age: 76
DRG: 470 | End: 2021-03-20
Attending: ORTHOPAEDIC SURGERY
Payer: MEDICARE

## 2021-03-20 ENCOUNTER — APPOINTMENT (OUTPATIENT)
Dept: OCCUPATIONAL THERAPY | Facility: CLINIC | Age: 76
DRG: 470 | End: 2021-03-20
Attending: ORTHOPAEDIC SURGERY
Payer: MEDICARE

## 2021-03-20 LAB
CREAT SERPL-MCNC: 0.69 MG/DL (ref 0.52–1.04)
GFR SERPL CREATININE-BSD FRML MDRD: 86 ML/MIN/{1.73_M2}
GLUCOSE BLDC GLUCOMTR-MCNC: 122 MG/DL (ref 70–99)
HGB BLD-MCNC: 10.4 G/DL (ref 11.7–15.7)

## 2021-03-20 PROCEDURE — 97535 SELF CARE MNGMENT TRAINING: CPT | Mod: GO

## 2021-03-20 PROCEDURE — 85018 HEMOGLOBIN: CPT | Performed by: PHYSICIAN ASSISTANT

## 2021-03-20 PROCEDURE — 250N000013 HC RX MED GY IP 250 OP 250 PS 637: Performed by: PHYSICIAN ASSISTANT

## 2021-03-20 PROCEDURE — 97530 THERAPEUTIC ACTIVITIES: CPT | Mod: GP | Performed by: PHYSICAL THERAPIST

## 2021-03-20 PROCEDURE — 36415 COLL VENOUS BLD VENIPUNCTURE: CPT | Performed by: PHYSICIAN ASSISTANT

## 2021-03-20 PROCEDURE — 97116 GAIT TRAINING THERAPY: CPT | Mod: GP | Performed by: PHYSICAL THERAPIST

## 2021-03-20 PROCEDURE — 250N000011 HC RX IP 250 OP 636: Performed by: PHYSICIAN ASSISTANT

## 2021-03-20 PROCEDURE — 97165 OT EVAL LOW COMPLEX 30 MIN: CPT | Mod: GO

## 2021-03-20 PROCEDURE — 82565 ASSAY OF CREATININE: CPT | Performed by: PHYSICIAN ASSISTANT

## 2021-03-20 PROCEDURE — 82962 GLUCOSE BLOOD TEST: CPT

## 2021-03-20 RX ORDER — HYDROXYZINE HYDROCHLORIDE 10 MG/1
10 TABLET, FILM COATED ORAL EVERY 6 HOURS PRN
Qty: 30 TABLET | Refills: 0 | Status: SHIPPED | OUTPATIENT
Start: 2021-03-20

## 2021-03-20 RX ORDER — KETOROLAC TROMETHAMINE 10 MG/1
10 TABLET, FILM COATED ORAL EVERY 6 HOURS PRN
Qty: 4 TABLET | Refills: 0 | Status: SHIPPED | OUTPATIENT
Start: 2021-03-20

## 2021-03-20 RX ORDER — KETOROLAC TROMETHAMINE 15 MG/ML
15 INJECTION, SOLUTION INTRAMUSCULAR; INTRAVENOUS EVERY 6 HOURS
Status: COMPLETED | OUTPATIENT
Start: 2021-03-20 | End: 2021-03-21

## 2021-03-20 RX ORDER — ACETAMINOPHEN 325 MG/1
650 TABLET ORAL EVERY 4 HOURS PRN
Qty: 100 TABLET | Refills: 0 | Status: SHIPPED | OUTPATIENT
Start: 2021-03-20

## 2021-03-20 RX ORDER — DOCUSATE SODIUM 100 MG/1
100 CAPSULE, LIQUID FILLED ORAL 2 TIMES DAILY
Qty: 30 CAPSULE | Refills: 0 | Status: SHIPPED | OUTPATIENT
Start: 2021-03-20

## 2021-03-20 RX ORDER — KETOROLAC TROMETHAMINE 15 MG/ML
15 INJECTION, SOLUTION INTRAMUSCULAR; INTRAVENOUS EVERY 6 HOURS PRN
Status: DISCONTINUED | OUTPATIENT
Start: 2021-03-21 | End: 2021-03-23 | Stop reason: HOSPADM

## 2021-03-20 RX ORDER — OXYCODONE HYDROCHLORIDE 5 MG/1
5-10 TABLET ORAL EVERY 4 HOURS PRN
Qty: 30 TABLET | Refills: 0 | Status: SHIPPED | OUTPATIENT
Start: 2021-03-20

## 2021-03-20 RX ORDER — ONDANSETRON 4 MG/1
4 TABLET, ORALLY DISINTEGRATING ORAL EVERY 6 HOURS PRN
Qty: 30 TABLET | Refills: 0 | Status: SHIPPED | OUTPATIENT
Start: 2021-03-20

## 2021-03-20 RX ORDER — AMOXICILLIN 250 MG
1 CAPSULE ORAL 2 TIMES DAILY
Qty: 30 TABLET | Refills: 0 | Status: SHIPPED | OUTPATIENT
Start: 2021-03-20

## 2021-03-20 RX ADMIN — SENNOSIDES AND DOCUSATE SODIUM 1 TABLET: 8.6; 5 TABLET ORAL at 08:40

## 2021-03-20 RX ADMIN — Medication 25 MCG: at 08:40

## 2021-03-20 RX ADMIN — HYDROXYZINE HYDROCHLORIDE 10 MG: 10 TABLET, FILM COATED ORAL at 10:40

## 2021-03-20 RX ADMIN — DOCUSATE SODIUM 100 MG: 100 CAPSULE, LIQUID FILLED ORAL at 08:40

## 2021-03-20 RX ADMIN — SENNOSIDES AND DOCUSATE SODIUM 1 TABLET: 8.6; 5 TABLET ORAL at 21:03

## 2021-03-20 RX ADMIN — RIVAROXABAN 10 MG: 10 TABLET, FILM COATED ORAL at 08:40

## 2021-03-20 RX ADMIN — CEFAZOLIN 1 G: 330 INJECTION, POWDER, FOR SOLUTION INTRAMUSCULAR; INTRAVENOUS at 02:11

## 2021-03-20 RX ADMIN — KETOROLAC TROMETHAMINE 15 MG: 15 INJECTION, SOLUTION INTRAMUSCULAR; INTRAVENOUS at 17:05

## 2021-03-20 RX ADMIN — LEVOTHYROXINE SODIUM 75 MCG: 75 TABLET ORAL at 08:40

## 2021-03-20 RX ADMIN — DOCUSATE SODIUM 100 MG: 100 CAPSULE, LIQUID FILLED ORAL at 21:03

## 2021-03-20 RX ADMIN — OXYCODONE HYDROCHLORIDE 5 MG: 5 TABLET ORAL at 21:03

## 2021-03-20 RX ADMIN — SIMVASTATIN 20 MG: 20 TABLET, FILM COATED ORAL at 23:02

## 2021-03-20 RX ADMIN — POLYETHYLENE GLYCOL 3350 17 G: 17 POWDER, FOR SOLUTION ORAL at 08:41

## 2021-03-20 RX ADMIN — OXYCODONE HYDROCHLORIDE 5 MG: 5 TABLET ORAL at 15:49

## 2021-03-20 RX ADMIN — FLUOXETINE 40 MG: 20 CAPSULE ORAL at 08:40

## 2021-03-20 RX ADMIN — TRAZODONE HYDROCHLORIDE 50 MG: 50 TABLET ORAL at 23:02

## 2021-03-20 RX ADMIN — BUSPIRONE HYDROCHLORIDE 7.5 MG: 5 TABLET ORAL at 08:40

## 2021-03-20 RX ADMIN — KETOROLAC TROMETHAMINE 15 MG: 15 INJECTION, SOLUTION INTRAMUSCULAR; INTRAVENOUS at 23:02

## 2021-03-20 RX ADMIN — OXYCODONE HYDROCHLORIDE 10 MG: 5 TABLET ORAL at 08:40

## 2021-03-20 RX ADMIN — TRAZODONE HYDROCHLORIDE 50 MG: 50 TABLET ORAL at 00:17

## 2021-03-20 RX ADMIN — HYDROXYZINE HYDROCHLORIDE 10 MG: 10 TABLET, FILM COATED ORAL at 21:03

## 2021-03-20 RX ADMIN — KETOROLAC TROMETHAMINE 15 MG: 15 INJECTION, SOLUTION INTRAMUSCULAR; INTRAVENOUS at 11:27

## 2021-03-20 RX ADMIN — OXYCODONE HYDROCHLORIDE 10 MG: 5 TABLET ORAL at 04:42

## 2021-03-20 NOTE — PROGRESS NOTES
"Nicole Lima  3/20/2021  POD # 1 sp ezekiel and abductor repair.    Admit Date:  3/19/2021      Normal healing wound.  No immediate surgical complications identified.  No excessive bleeding  Objective:  Blood pressure 99/57, pulse 58, temperature 98.7  F (37.1  C), temperature source Oral, resp. rate 16, height 1.753 m (5' 9\"), weight 77.6 kg (171 lb), SpO2 96 %.    Temperatures:  Current - Temp: 98.7  F (37.1  C); Max - Temp  Av.4  F (36.9  C)  Min: 98  F (36.7  C)  Max: 98.8  F (37.1  C)  Pulse range: Pulse  Av.1  Min: 53  Max: 67  Blood pressure range: Systolic (24hrs), Av , Min:91 , Max:124   ; Diastolic (24hrs), Av, Min:51, Max:112    Exam:  CMS: intact  alert, stable, wound ok  Calf amol white.       Labs:  No results for input(s): POTASSIUM in the last 01868 hours.  Recent Labs   Lab Test 21  0745   HGB 10.4*     No results for input(s): INR in the last 40651 hours.  No results for input(s): PLT in the last 27723 hours.    PLAN: Weight bearing as tolerated  Continue physical therapy  Pain control measures  Additional problems to be followed by medicine physician  Pt slow with PT. Pain not well controlled. Will add toradol.   Possible discontinue .     "

## 2021-03-20 NOTE — PROGRESS NOTES
03/20/21 1100   Quick Adds   Type of Visit Initial Occupational Therapy Evaluation   Living Environment   People in home spouse   Current Living Arrangements house  ([place])   Home Accessibility stairs to enter home;stairs within home   Number of Stairs, Main Entrance 4   Stair Railings, Main Entrance railings safe and in good condition   Number of Stairs, Within Home, Primary other (see comments)  (12)   Stair Railings, Within Home, Primary railings safe and in good condition   Transportation Anticipated family or friend will provide   Living Environment Comments Pt has a walk-in shower with all necessary AE.    Self-Care   Usual Activity Tolerance good   Current Activity Tolerance fair   Regular Exercise No   Equipment Currently Used at Home shower chair;grab bar, tub/shower;dressing device;raised toilet seat   Disability/Function   Hearing Difficulty or Deaf yes   Describe hearing loss bilateral hearing loss   Use of hearing assistive devices none   Were auxiliary aids offered? no   The following aids were provided; patient declined offer of auxiliary aids   Hearing Management Loud volume of voice   Wear Glasses or Blind yes   Vision Management Wears glasses daily   Concentrating, Remembering or Making Decisions Difficulty no   Difficulty Communicating no   Difficulty Eating/Swallowing no   Walking or Climbing Stairs Difficulty yes   Walking or Climbing Stairs ambulation difficulty, requires equipment  (Due to pain in RLE hip)   Dressing/Bathing Difficulty no   Toileting issues no   Doing Errands Independently Difficulty (such as shopping) yes   Errands Management Drives at baseline   Fall history within last six months no   Change in Functional Status Since Onset of Current Illness/Injury yes   General Information   Onset of Illness/Injury or Date of Surgery 03/19/21   Referring Physician Gloria Hannon MD   Patient/Family Therapy Goal Statement (OT) Return home   Additional Occupational Profile  Info/Pertinent History of Current Problem Pt 76 y/o s/p R JABIER.    Performance Patterns (Routines, Roles, Habits) PLOF: ind with I/ADL's including driving. Walk-in shower located on lower level of home with full flight of stairs.    Existing Precautions/Restrictions weight bearing   Right Lower Extremity (Weight-bearing Status) weight-bearing as tolerated (WBAT)   General Observations and Info Pt demo'd positive demeanor and agreeable to therapy   Cognitive Status Examination   Orientation Status orientation to person, place and time   Affect/Mental Status (Cognitive) WNL   Follows Commands WNL   Visual Perception   Visual Impairment/Limitations corrective lenses full-time   Sensory   Sensory Quick Adds No deficits were identified   Pain Assessment   Patient Currently in Pain Yes, see Vital Sign flowsheet   Posture   Posture not impaired   Range of Motion Comprehensive   General Range of Motion no range of motion deficits identified   Comment, General Range of Motion WFL for ADL's   Strength Comprehensive (MMT)   General Manual Muscle Testing (MMT) Assessment no strength deficits identified   Comment, General Manual Muscle Testing (MMT) Assessment WFL for ADL's   Muscle Tone Assessment   Muscle Tone Quick Adds No deficits were identified   Muscle Tone Comments WFL for ADL's   Coordination   Upper Extremity Coordination No deficits were identified   Coordination Comments WFL for ADL's   Bed Mobility   Bed Mobility supine-sit;sit-supine   Supine-Sit [place] (Bed Mobility) supervision   Sit-Supine [place] (Bed Mobility) minimum assist (75% patient effort);verbal cues   Assistive Device (Bed Mobility) bed rails   Transfers   Transfers toilet transfer;sit-stand transfer   Sit-Stand Transfer   Sit-Stand [place] (Transfers) supervision;verbal cues   Assistive Device (Sit-Stand Transfers) walker, standard   Toilet Transfer   Type (Toilet Transfer) stand-sit;sit-stand   [place] Level (Toilet Transfer)  supervision;verbal cues   Assistive Device (Toilet Transfer) grab bars/safety frame;walker, standard   Balance   Balance Assessment no deficits were identified   Activities of Daily Living   BADL Assessment/Intervention upper body dressing;lower body dressing;grooming;feeding;toileting   Upper Body Dressing Assessment/Training   [place] Level (Upper Body Dressing) set up;supervision   Position (Upper Body Dressing) edge of bed sitting   Lower Body Dressing Assessment/Training   [place] Level (Lower Body Dressing) supervision;verbal cues;set up   Position (Lower Body Dressing) edge of bed sitting   Grooming Assessment/Training   [place] Level (Grooming) supervision   Position (Grooming) supported standing   Eating/Self Feeding   [place] Level (Feeding) independent   Toileting   [place] Level (Toileting) supervision   Position (Toileting) supported sitting   Instrumental Activities of Daily Living (IADL)   Previous Responsibilities meal prep;housekeeping;laundry;driving;yardwork   IADL Comments Granddaughter will assist with laundry and home management tasks.    Clinical Impression   Criteria for Skilled Therapeutic Interventions Met (OT) yes   OT Diagnosis Decreased ind with I/ADL's   OT Problem List-Impairments impacting ADL activity tolerance impaired;pain;strength   ADL comments/analysis PLOF: ind with I/ADL's including driving   Assessment of Occupational Performance 1-3 Performance Deficits   Identified Performance Deficits Dressing, bathing, toileting   Planned Therapy Interventions (OT) ADL retraining;IADL retraining;progressive activity/exercise;visual perception   Clinical Decision Making Complexity (OT) moderate complexity   Therapy Frequency (OT) Daily   Predicted Duration of Therapy 2 days   Risk & Benefits of therapy have been explained evaluation/treatment results reviewed;care plan/treatment goals reviewed;risks/benefits reviewed   OT Discharge Planning    OT Discharge  Recommendation (DC Rec)   (Defer to ortho)   OT Rationale for DC Rec Pt progressing and motivated to engage in therapy. Pt reported all needs  met on main level in home environment. Pt reported spouse available to  assist with I/ADL's. Pt has all necessary AE in home environment for safe completion of ADL's and functional transfers.    OT Brief overview of current status  Min A bed mobility sit <> supine; SBA EOB donning/doffing socks; CGA mobility; SBA functional transfers; high pain levels   Total Evaluation Time (Minutes)   Total Evaluation Time (Minutes) 8

## 2021-03-20 NOTE — PROGRESS NOTES
Patient vital signs are at baseline: Yes  Patient able to ambulate as they were prior to admission or with assist devices provided by therapies during their stay:  Yes  Patient MUST void prior to discharge:  Yes  Patient able to tolerate oral intake:  Yes  Pain has adequate pain control using Oral analgesics:  No,  Reason:  Patient feels like pain could be better controlled. MD added scheduled IV Tordol. PRN Oxycodone and Atarax given as able.

## 2021-03-20 NOTE — PLAN OF CARE
PRN Oxycodone and Atarax with scheduled IV Tordol given for pain management this shift, up with one assist/GB/walker, voiding via BSC or bathroom, and patient is progressing toward plan of care and will possibly discharge home tomorrow.

## 2021-03-20 NOTE — PLAN OF CARE
"Summary: POD 1 R hip abductor repair and total arthroplasty   Neuro: Aox4 calm cooperative  Cardiac: bradycardic  Resp: On RA  GI: Bowel sounds active, passing gas  : Voiding in bedpan  CMS: Intact  Mobility: A2  Pain: R hip, controlled with oxycodone  Diet: Regular  Other Important Info: R hip brace to remain in place at all times    /68 (BP Location: Right arm)   Pulse 56   Temp 98.7  F (37.1  C) (Oral)   Resp 16   Ht 1.753 m (5' 9\")   Wt 77.6 kg (171 lb)   SpO2 95%   BMI 25.25 kg/m      "

## 2021-03-21 ENCOUNTER — APPOINTMENT (OUTPATIENT)
Dept: OCCUPATIONAL THERAPY | Facility: CLINIC | Age: 76
DRG: 470 | End: 2021-03-21
Attending: ORTHOPAEDIC SURGERY
Payer: MEDICARE

## 2021-03-21 ENCOUNTER — APPOINTMENT (OUTPATIENT)
Dept: PHYSICAL THERAPY | Facility: CLINIC | Age: 76
DRG: 470 | End: 2021-03-21
Attending: ORTHOPAEDIC SURGERY
Payer: MEDICARE

## 2021-03-21 LAB
GLUCOSE SERPL-MCNC: 89 MG/DL (ref 70–99)
HGB BLD-MCNC: 9.5 G/DL (ref 11.7–15.7)
INTERPRETATION ECG - MUSE: NORMAL

## 2021-03-21 PROCEDURE — 97530 THERAPEUTIC ACTIVITIES: CPT | Mod: GP

## 2021-03-21 PROCEDURE — 85018 HEMOGLOBIN: CPT | Performed by: PHYSICIAN ASSISTANT

## 2021-03-21 PROCEDURE — 97535 SELF CARE MNGMENT TRAINING: CPT | Mod: GO | Performed by: REHABILITATION PRACTITIONER

## 2021-03-21 PROCEDURE — 97116 GAIT TRAINING THERAPY: CPT | Mod: GP,CQ

## 2021-03-21 PROCEDURE — 36415 COLL VENOUS BLD VENIPUNCTURE: CPT | Performed by: PHYSICIAN ASSISTANT

## 2021-03-21 PROCEDURE — 250N000013 HC RX MED GY IP 250 OP 250 PS 637: Performed by: PHYSICIAN ASSISTANT

## 2021-03-21 PROCEDURE — 120N000001 HC R&B MED SURG/OB

## 2021-03-21 PROCEDURE — 250N000013 HC RX MED GY IP 250 OP 250 PS 637: Performed by: ORTHOPAEDIC SURGERY

## 2021-03-21 PROCEDURE — 250N000011 HC RX IP 250 OP 636: Performed by: PHYSICIAN ASSISTANT

## 2021-03-21 PROCEDURE — 82947 ASSAY GLUCOSE BLOOD QUANT: CPT | Performed by: PHYSICIAN ASSISTANT

## 2021-03-21 RX ORDER — ACETAMINOPHEN 325 MG/1
325-650 TABLET ORAL EVERY 4 HOURS PRN
Status: DISCONTINUED | OUTPATIENT
Start: 2021-03-21 | End: 2021-03-23 | Stop reason: HOSPADM

## 2021-03-21 RX ORDER — ACETAMINOPHEN 325 MG/1
325 TABLET ORAL EVERY 4 HOURS PRN
Status: DISCONTINUED | OUTPATIENT
Start: 2021-03-21 | End: 2021-03-21

## 2021-03-21 RX ADMIN — RIVAROXABAN 10 MG: 10 TABLET, FILM COATED ORAL at 08:30

## 2021-03-21 RX ADMIN — ACETAMINOPHEN 650 MG: 325 TABLET, FILM COATED ORAL at 16:49

## 2021-03-21 RX ADMIN — LEVOTHYROXINE SODIUM 75 MCG: 75 TABLET ORAL at 06:47

## 2021-03-21 RX ADMIN — OXYCODONE HYDROCHLORIDE 10 MG: 5 TABLET ORAL at 08:30

## 2021-03-21 RX ADMIN — ACETAMINOPHEN 325 MG: 325 TABLET, FILM COATED ORAL at 08:35

## 2021-03-21 RX ADMIN — DOCUSATE SODIUM 100 MG: 100 CAPSULE, LIQUID FILLED ORAL at 08:30

## 2021-03-21 RX ADMIN — SIMVASTATIN 20 MG: 20 TABLET, FILM COATED ORAL at 21:27

## 2021-03-21 RX ADMIN — KETOROLAC TROMETHAMINE 15 MG: 15 INJECTION, SOLUTION INTRAMUSCULAR; INTRAVENOUS at 20:26

## 2021-03-21 RX ADMIN — BUSPIRONE HYDROCHLORIDE 7.5 MG: 5 TABLET ORAL at 08:30

## 2021-03-21 RX ADMIN — Medication 25 MCG: at 08:30

## 2021-03-21 RX ADMIN — HYDROXYZINE HYDROCHLORIDE 10 MG: 10 TABLET, FILM COATED ORAL at 10:28

## 2021-03-21 RX ADMIN — KETOROLAC TROMETHAMINE 15 MG: 15 INJECTION, SOLUTION INTRAMUSCULAR; INTRAVENOUS at 05:54

## 2021-03-21 RX ADMIN — FLUOXETINE 40 MG: 20 CAPSULE ORAL at 08:30

## 2021-03-21 RX ADMIN — OXYCODONE HYDROCHLORIDE 5 MG: 5 TABLET ORAL at 15:00

## 2021-03-21 RX ADMIN — DOCUSATE SODIUM 100 MG: 100 CAPSULE, LIQUID FILLED ORAL at 20:28

## 2021-03-21 RX ADMIN — SENNOSIDES AND DOCUSATE SODIUM 1 TABLET: 8.6; 5 TABLET ORAL at 08:30

## 2021-03-21 RX ADMIN — SENNOSIDES AND DOCUSATE SODIUM 1 TABLET: 8.6; 5 TABLET ORAL at 20:27

## 2021-03-21 RX ADMIN — TRAZODONE HYDROCHLORIDE 50 MG: 50 TABLET ORAL at 21:27

## 2021-03-21 ASSESSMENT — ACTIVITIES OF DAILY LIVING (ADL): ADLS_ACUITY_SCORE: 18

## 2021-03-21 NOTE — PLAN OF CARE
Temp decreased to 99.4 - PRN Tylenol given and IS encouraged, up with one assist/GB/walker, voiding via BSC, abductor brace on at all times, PRN Oxycodone and Atarax given for pain, and patient will discharge home tomorrow before 11am - monitor for temps and patient will get one more P.T. session prior to discharge.

## 2021-03-21 NOTE — PROVIDER NOTIFICATION
Call placed to Dr. Hannon's PA, Alphonso. Asked if he or Dr. Hannon would be seeing patient today. Alphonso stated that the ortho rounder on today should have seen patient and to reach out to them about that. Notified Alphonso that patient is worried about pain control and mobility upon returning home. Physical therapy will see patient one more time today. Patient was also running fevers overnight. Alphonso stated that patient could stay one more night and get one more P.T. in the morning and to monitor for fevers but if patient is feeling up to it and does well with P.T. patient can discharge home still today. Will continue to monitor.

## 2021-03-21 NOTE — PROVIDER NOTIFICATION
Call placed to Adelaide Arellano to see if she rounded on patient today or is planning on rounding later today. Awaiting return call.    1439: Benjamin returned call and stated she will see patient after she is done in OR.

## 2021-03-21 NOTE — UTILIZATION REVIEW
Admission Status; Secondary Review Determination         Under the authority of the Utilization Management Committee, the utilization review process indicated a secondary review on the above patient.  The review outcome is based on review of the medical records, discussions with staff, and applying clinical experience noted on the date of the review.        (x)      Inpatient Status Appropriate - This patient's medical care is consistent with medical management for inpatient care and reasonable inpatient medical practice.      RATIONALE FOR DETERMINATION   The patient is a 75-year-old female admitted on 03/19/2021.  She is postop day #2 currently from a right total hip arthroplasty and abductor repair.  She had a previous left total hip arthroplasty done at [facility].  She has had low-grade fevers post operatively up to 100.9 and is using incentive spirometry regularly.  PT has seen her and is working with her.  Hemoglobin today is 9.5 and yesterday it was 10.4.  Due to fever and need for physical therapy, she is required an additional day in the hospital and likely will be discharged tomorrow.  We are trying to reach the physicians assistant for Dr. Qian kim to change the patient's status from outpatient to inpatient.      The severity of illness, intensity of service provided, expected LOS and risk for adverse outcome make the care complex, high risk and appropriate for hospital admission.        The information on this document is developed by the utilization review team in order for the business office to ensure compliance.  This only denotes the appropriateness of proper admission status and does not reflect the quality of care rendered.         The definitions of Inpatient Status and Observation Status used in making the determination above are those provided in the CMS Coverage Manual, Chapter 1 and Chapter 6, section 70.4.      Sincerely,     Ayad Crespo MD  Physician Advisor  Utilization Review/  Case Management  [facility].

## 2021-03-21 NOTE — PLAN OF CARE
Patient vital signs are at baseline: Partially met,. TEMP .   Patient able to ambulate as they were prior to admission or with assist devices provided by therapies during their stay:  MET.   Patient MUST void prior to discharge:  met  Patient able to tolerate oral intake:  met  Pain has adequate pain control using Oral analgesics: partially met     Temp of 100, other vitals stable. Tylenol given. CMS intact. Up with 1 and walker. Pain managed with oxycodone and atarax. Dressing c/d/I. A&OX4. Discharge home tomorrow after AM PT.

## 2021-03-21 NOTE — PROGRESS NOTES
ORTHO PROGRESS NOTE      Procedure(s):  RIGHT ABDUCTOR REPAIR  RIGHT TOTAL HIP ARTHROPLASTY   -2 Days Post-Op      Still feeling feverish  Pain ok currently  Dizzy when up with PT this am    Vital Signs with Ranges  Temp:  [99.4  F (37.4  C)-100.9  F (38.3  C)] 99.4  F (37.4  C)  Pulse:  [65-68] 65  Resp:  [16] 16  BP: (104-112)/(55-58) 104/55  SpO2:  [91 %-95 %] 91 %  I/O last 3 completed shifts:  In: 2130 [P.O.:2130]  Out: -   Recent Labs   Lab 03/21/21  0856 03/20/21  0745   HGB 9.5* 10.4*       Dressing clean, dry and intact.  Calves soft bilaterally  Swelling appropriate for post operative condition  Hip abductor brace in place      Plan:  -WBAT with hip abductor brace in place  - Physical Therapy as able.  - May need orthostatics if still dizzy when up ambulating  -Continue pain control measures  - Still experiencing fevers, encouraged incentive spriometer  -Discharge to home tomorrow     Adelaide Mueller PA-C

## 2021-03-22 ENCOUNTER — APPOINTMENT (OUTPATIENT)
Dept: PHYSICAL THERAPY | Facility: CLINIC | Age: 76
DRG: 470 | End: 2021-03-22
Attending: ORTHOPAEDIC SURGERY
Payer: MEDICARE

## 2021-03-22 LAB
ALBUMIN UR-MCNC: NEGATIVE MG/DL
ANION GAP SERPL CALCULATED.3IONS-SCNC: 4 MMOL/L (ref 3–14)
APPEARANCE UR: CLEAR
BASOPHILS # BLD AUTO: 0 10E9/L (ref 0–0.2)
BASOPHILS NFR BLD AUTO: 0.3 %
BILIRUB UR QL STRIP: NEGATIVE
BUN SERPL-MCNC: 17 MG/DL (ref 7–30)
CALCIUM SERPL-MCNC: 7.8 MG/DL (ref 8.5–10.1)
CHLORIDE SERPL-SCNC: 108 MMOL/L (ref 94–109)
CO2 SERPL-SCNC: 27 MMOL/L (ref 20–32)
COLOR UR AUTO: YELLOW
CREAT SERPL-MCNC: 0.63 MG/DL (ref 0.52–1.04)
DIFFERENTIAL METHOD BLD: ABNORMAL
EOSINOPHIL # BLD AUTO: 0.1 10E9/L (ref 0–0.7)
EOSINOPHIL NFR BLD AUTO: 2.4 %
ERYTHROCYTE [DISTWIDTH] IN BLOOD BY AUTOMATED COUNT: 13.3 % (ref 10–15)
GFR SERPL CREATININE-BSD FRML MDRD: 88 ML/MIN/{1.73_M2}
GLUCOSE SERPL-MCNC: 88 MG/DL (ref 70–99)
GLUCOSE UR STRIP-MCNC: NEGATIVE MG/DL
HCT VFR BLD AUTO: 27.8 % (ref 35–47)
HGB BLD-MCNC: 8.6 G/DL (ref 11.7–15.7)
HGB BLD-MCNC: 8.9 G/DL (ref 11.7–15.7)
HGB UR QL STRIP: NEGATIVE
IMM GRANULOCYTES # BLD: 0 10E9/L (ref 0–0.4)
IMM GRANULOCYTES NFR BLD: 0.3 %
KETONES UR STRIP-MCNC: NEGATIVE MG/DL
LEUKOCYTE ESTERASE UR QL STRIP: NEGATIVE
LYMPHOCYTES # BLD AUTO: 1.4 10E9/L (ref 0.8–5.3)
LYMPHOCYTES NFR BLD AUTO: 24.6 %
MCH RBC QN AUTO: 32.4 PG (ref 26.5–33)
MCHC RBC AUTO-ENTMCNC: 32 G/DL (ref 31.5–36.5)
MCV RBC AUTO: 101 FL (ref 78–100)
MONOCYTES # BLD AUTO: 0.8 10E9/L (ref 0–1.3)
MONOCYTES NFR BLD AUTO: 13.3 %
MUCOUS THREADS #/AREA URNS LPF: PRESENT /LPF
NEUTROPHILS # BLD AUTO: 3.4 10E9/L (ref 1.6–8.3)
NEUTROPHILS NFR BLD AUTO: 59.1 %
NITRATE UR QL: NEGATIVE
NRBC # BLD AUTO: 0 10*3/UL
NRBC BLD AUTO-RTO: 0 /100
PH UR STRIP: 5.5 PH (ref 5–7)
PLATELET # BLD AUTO: 164 10E9/L (ref 150–450)
POTASSIUM SERPL-SCNC: 4.5 MMOL/L (ref 3.4–5.3)
RBC # BLD AUTO: 2.75 10E12/L (ref 3.8–5.2)
RBC #/AREA URNS AUTO: <1 /HPF (ref 0–2)
SODIUM SERPL-SCNC: 139 MMOL/L (ref 133–144)
SOURCE: ABNORMAL
SP GR UR STRIP: 1.01 (ref 1–1.03)
SQUAMOUS #/AREA URNS AUTO: 0 /HPF (ref 0–1)
UROBILINOGEN UR STRIP-MCNC: 0 MG/DL (ref 0–2)
WBC # BLD AUTO: 5.8 10E9/L (ref 4–11)
WBC #/AREA URNS AUTO: <1 /HPF (ref 0–5)

## 2021-03-22 PROCEDURE — 99222 1ST HOSP IP/OBS MODERATE 55: CPT | Performed by: PHYSICIAN ASSISTANT

## 2021-03-22 PROCEDURE — 250N000013 HC RX MED GY IP 250 OP 250 PS 637: Performed by: PHYSICIAN ASSISTANT

## 2021-03-22 PROCEDURE — 97530 THERAPEUTIC ACTIVITIES: CPT | Mod: GP | Performed by: PHYSICAL THERAPIST

## 2021-03-22 PROCEDURE — 85018 HEMOGLOBIN: CPT | Performed by: PHYSICIAN ASSISTANT

## 2021-03-22 PROCEDURE — 258N000003 HC RX IP 258 OP 636: Performed by: PHYSICIAN ASSISTANT

## 2021-03-22 PROCEDURE — 250N000013 HC RX MED GY IP 250 OP 250 PS 637: Performed by: ORTHOPAEDIC SURGERY

## 2021-03-22 PROCEDURE — 97116 GAIT TRAINING THERAPY: CPT | Mod: GP | Performed by: PHYSICAL THERAPIST

## 2021-03-22 PROCEDURE — 80048 BASIC METABOLIC PNL TOTAL CA: CPT | Performed by: PHYSICIAN ASSISTANT

## 2021-03-22 PROCEDURE — 97110 THERAPEUTIC EXERCISES: CPT | Mod: GP | Performed by: PHYSICAL THERAPIST

## 2021-03-22 PROCEDURE — 36415 COLL VENOUS BLD VENIPUNCTURE: CPT | Performed by: PHYSICIAN ASSISTANT

## 2021-03-22 PROCEDURE — 120N000001 HC R&B MED SURG/OB

## 2021-03-22 PROCEDURE — 85025 COMPLETE CBC W/AUTO DIFF WBC: CPT | Performed by: PHYSICIAN ASSISTANT

## 2021-03-22 PROCEDURE — 81001 URINALYSIS AUTO W/SCOPE: CPT | Performed by: PHYSICIAN ASSISTANT

## 2021-03-22 PROCEDURE — 99207 PR CONSULT E&M CHANGED TO INITIAL LEVEL: CPT | Performed by: PHYSICIAN ASSISTANT

## 2021-03-22 RX ORDER — SODIUM CHLORIDE 9 MG/ML
INJECTION, SOLUTION INTRAVENOUS CONTINUOUS
Status: DISCONTINUED | OUTPATIENT
Start: 2021-03-22 | End: 2021-03-23

## 2021-03-22 RX ORDER — ACETAMINOPHEN 500 MG
1000 TABLET ORAL 3 TIMES DAILY
Status: DISCONTINUED | OUTPATIENT
Start: 2021-03-22 | End: 2021-03-23 | Stop reason: HOSPADM

## 2021-03-22 RX ADMIN — FLUOXETINE 40 MG: 20 CAPSULE ORAL at 08:01

## 2021-03-22 RX ADMIN — DOCUSATE SODIUM 100 MG: 100 CAPSULE, LIQUID FILLED ORAL at 08:01

## 2021-03-22 RX ADMIN — SENNOSIDES AND DOCUSATE SODIUM 1 TABLET: 8.6; 5 TABLET ORAL at 08:01

## 2021-03-22 RX ADMIN — HYDROXYZINE HYDROCHLORIDE 10 MG: 10 TABLET, FILM COATED ORAL at 13:04

## 2021-03-22 RX ADMIN — TRAZODONE HYDROCHLORIDE 50 MG: 50 TABLET ORAL at 21:57

## 2021-03-22 RX ADMIN — SODIUM CHLORIDE 1000 ML: 9 INJECTION, SOLUTION INTRAVENOUS at 14:44

## 2021-03-22 RX ADMIN — OXYCODONE HYDROCHLORIDE 10 MG: 5 TABLET ORAL at 07:49

## 2021-03-22 RX ADMIN — OXYCODONE HYDROCHLORIDE 5 MG: 5 TABLET ORAL at 17:09

## 2021-03-22 RX ADMIN — Medication 25 MCG: at 08:01

## 2021-03-22 RX ADMIN — LEVOTHYROXINE SODIUM 75 MCG: 75 TABLET ORAL at 06:33

## 2021-03-22 RX ADMIN — BUSPIRONE HYDROCHLORIDE 7.5 MG: 5 TABLET ORAL at 08:01

## 2021-03-22 RX ADMIN — SIMVASTATIN 20 MG: 20 TABLET, FILM COATED ORAL at 21:57

## 2021-03-22 RX ADMIN — POLYETHYLENE GLYCOL 3350 17 G: 17 POWDER, FOR SOLUTION ORAL at 08:01

## 2021-03-22 RX ADMIN — MAGNESIUM HYDROXIDE 30 ML: 400 SUSPENSION ORAL at 18:10

## 2021-03-22 RX ADMIN — ACETAMINOPHEN 1000 MG: 500 TABLET, FILM COATED ORAL at 21:57

## 2021-03-22 RX ADMIN — ACETAMINOPHEN 1000 MG: 500 TABLET, FILM COATED ORAL at 16:37

## 2021-03-22 RX ADMIN — SODIUM CHLORIDE: 9 INJECTION, SOLUTION INTRAVENOUS at 16:52

## 2021-03-22 RX ADMIN — RIVAROXABAN 10 MG: 10 TABLET, FILM COATED ORAL at 08:01

## 2021-03-22 RX ADMIN — ACETAMINOPHEN 650 MG: 325 TABLET, FILM COATED ORAL at 13:04

## 2021-03-22 ASSESSMENT — ACTIVITIES OF DAILY LIVING (ADL)
ADLS_ACUITY_SCORE: 18

## 2021-03-22 NOTE — DISCHARGE SUMMARY
Orthopedic Discharge Summary   Patient: Nicole Lima  Admission Date: 3/19/2021  Discharge Date: 3/22/2021  Date of Service: 3/22/2021  Attending Provider: Gloria Hannon MD  Admission Diagnosis: Primary osteoarthritis of right hip [M16.11]  History of total hip replacement [Z96.649]  History of total right hip replacement [Z96.641]  Discharge Diagnosis: right hip osteoarthritis and abductor tendon tear  Procedures Performed: right total hip replacement and abductor tendo repair  Complications: None apparent   History of Present Illness: see operative report for full HPI  Past Medical History:   Past Medical History:   Diagnosis Date     Anxiety      Arthritis     primary OA bilat hip     Ascending aorta dilatation (H)      Chronic low back pain      Degenerative lumbar disc      Depression, major, recurrent, in partial remission (H)      Dermatochalasis of both lower eyelids     and left upper eyelid     Diverticulosis      DVT (deep venous thrombosis) (H)      Dyspnea     multifactorial     Enthesopathy of hip     right     GERD (gastroesophageal reflux disease)      Hyperlipidemia      Hypothyroidism      Insomnia      Interstitial cystitis      Keratosis, actinic      Macrocytosis      Myalgia      Plantar neuroma of left foot      PONV (postoperative nausea and vomiting)      Pulmonary embolus (H)      Sensorineural hearing loss      Tendinopathy of left gluteus medius      Trochanteric bursitis, right hip      Venous insufficiency (chronic) (peripheral)      Patient Active Problem List   Diagnosis     History of total hip replacement     Past Surgical History:   Procedure Laterality Date     ABDOMEN SURGERY  09/19/2003    laparoscopy with fulguration r excision of lesions of the ovary, pelvic viscera or peritoneal surface     APPENDECTOMY  11/12/1965     BREAST SURGERY  12/2006    biopsy x 3     COLONOSCOPY  03/24/2014    with poypectomy     ENT SURGERY  05/15/2017    reduction of nasal turbinate      ENT SURGERY  1950    tonisllectomy     EYE SURGERY      intraocular lens implant     EYE SURGERY  05/16/2018    blepharoplasty     GENITOURINARY SURGERY  1978, 1998    bladder repair     GYN SURGERY  11/12/1978    hysterectomy, salpingo oopherectomy     HERNIA REPAIR  1975    umbilical     ORTHOPEDIC SURGERY Left 08/21/2019    JABIER     ORTHOPEDIC SURGERY  03/09/2018    excision of berman's neroma     ORTHOPEDIC SURGERY  10/20/2017    exostectomy     ORTHOPEDIC SURGERY  08/24/2004    excision of berman's neuroma, correctin of hallux valgus     ORTHOPEDIC SURGERY  11/1996    excion of berman's neuroma     ORTHOPEDIC SURGERY  10/2012    excision of berman's neuroma     ORTHOPEDIC SURGERY  03/09/2018    removal of hardware second metatarsal     ORTHOPEDIC SURGERY  04/07/2013    hammer toe correction     ORTHOPEDIC SURGERY  11/17/2011    arthroplasty of wrist and hand     ORTHOPEDIC SURGERY  10/19/2012    repair of joint capsule     ORTHOPEDIC SURGERY  04/05/2013    bunionectomy     ORTHOPEDIC SURGERY  11/12/1994    repair of hallux valgus     ORTHOPEDIC SURGERY  10/20/2017    chellectomy of metatarsal     Social History     Socioeconomic History     Marital status:      Spouse name: Not on file     Number of children: Not on file     Years of education: Not on file     Highest education level: Not on file   Occupational History     Not on file   Social Needs     Financial resource strain: Not on file     Food insecurity     Worry: Not on file     Inability: Not on file     Transportation needs     Medical: Not on file     Non-medical: Not on file   Tobacco Use     Smoking status: Never Smoker     Smokeless tobacco: Never Used   Substance and Sexual Activity     Alcohol use: Yes     Comment: couple drinks a week     Drug use: Not on file     Sexual activity: Not on file   Lifestyle     Physical activity     Days per week: Not on file     Minutes per session: Not on file     Stress: Not on file   Relationships      Social connections     Talks on phone: Not on file     Gets together: Not on file     Attends Muslim service: Not on file     Active member of club or organization: Not on file     Attends meetings of clubs or organizations: Not on file     Relationship status: Not on file     Intimate partner violence     Fear of current or ex partner: Not on file     Emotionally abused: Not on file     Physically abused: Not on file     Forced sexual activity: Not on file   Other Topics Concern     Parent/sibling w/ CABG, MI or angioplasty before 65F 55M? Not Asked   Social History Narrative     Not on file     Medications on admission:   Medications Prior to Admission   Medication Sig Dispense Refill Last Dose     amoxicillin (AMOXIL) 500 MG tablet Take 2,000 mg by mouth daily as needed (1 hour prior to dental visits)   MORE THAN 1 WEEK at PRN     Ascorbic Acid (VITAMIN C) 500 MG CAPS Take 500 mg by mouth daily   3/10/2021     busPIRone (BUSPAR) 7.5 MG tablet Take 7.5 mg by mouth every morning    3/19/2021 at AM     Flaxseed, Linseed, (FLAXSEED OIL PO) Take 1,000 mg by mouth daily   3/10/2021     FLUoxetine (PROZAC) 40 MG capsule Take 40 mg by mouth daily   3/18/2021 at 1200     hypromellose (ARTIFICIAL TEARS) 0.5 % SOLN ophthalmic solution Place 1 drop into both eyes every hour as needed for dry eyes    at PRN     levothyroxine (SYNTHROID/LEVOTHROID) 75 MCG tablet Take 75 mcg by mouth daily   3/19/2021 at AM     simvastatin (ZOCOR) 20 MG tablet Take 20 mg by mouth At Bedtime   3/18/2021 at HS     traZODone (DESYREL) 50 MG tablet Take 50 mg by mouth At Bedtime   3/18/2021 at HS     Vitamin D3 (CHOLECALCIFEROL) 25 mcg (1000 units) tablet Take 1 tablet by mouth daily   3/10/2021     [DISCONTINUED] aspirin 81 MG EC tablet Take 81 mg by mouth daily   3/10/2021     [DISCONTINUED] ibuprofen (ADVIL/MOTRIN) 200 MG tablet Take 200 mg by mouth every 4 hours as needed for mild pain   MORE THAN 1 WEEK at PRN     Allergies:  No Known  Allergies    Hospital Course: Patient was admitted to Orthopedics and brought to the OR on where she underwent the above named procedure. Postoperatively she did very well with no apparent complications, and she had an uneventful hospital stay. Antibiotics were given for 24 hours after surgery. She was given xarelto for DVT prophylaxis and her pain was somewhat controlled with oral pain medications. She progressed slowly with therapy and discharge to home was recommended. Patient was dizzy occasionally when working with therapy. She slowly began to feel better over the weekend as she ate more consistently and drank fluids regularly.  Consultations Obtained During Hospitalization:  1. Hospitalist consult for orthostatic hypotension and dizziness.  2. Physical Therapy for gait training, mobilization, range of motion and strengthening exercises  3. Occupational Therapy for activities of daily living.  4. Social Work for disposition planning.  Active Problems:    History of total hip replacement    Discharge Disposition: patient improving  Discharge Diet: resume normal pre op diet  Discharge Medications:   Current Discharge Medication List      START taking these medications    Details   acetaminophen (TYLENOL) 325 MG tablet Take 2 tablets (650 mg) by mouth every 4 hours as needed for other (mild pain)  Qty: 100 tablet, Refills: 0    Associated Diagnoses: History of total right hip replacement      docusate sodium (COLACE) 100 MG capsule Take 1 capsule (100 mg) by mouth 2 times daily  Qty: 30 capsule, Refills: 0    Associated Diagnoses: History of total right hip replacement      !! hydrOXYzine (ATARAX) 10 MG tablet Take 1 tablet (10 mg) by mouth every 6 hours as needed for other (adjuvant pain)  Qty: 30 tablet, Refills: 0    Associated Diagnoses: History of total right hip replacement      !! hydrOXYzine (ATARAX) 10 MG tablet Take 1 tablet (10 mg) by mouth every 6 hours as needed for itching or anxiety (with pain,  moderate pain)  Qty: 30 tablet, Refills: 0    Associated Diagnoses: History of total right hip replacement      ketorolac (TORADOL) 10 MG tablet Take 1 tablet (10 mg) by mouth every 6 hours as needed for moderate pain  Qty: 4 tablet, Refills: 0    Associated Diagnoses: History of total right hip replacement      ondansetron (ZOFRAN-ODT) 4 MG ODT tab Take 1 tablet (4 mg) by mouth every 6 hours as needed for nausea or vomiting  Qty: 30 tablet, Refills: 0    Associated Diagnoses: History of total right hip replacement      oxyCODONE (ROXICODONE) 5 MG tablet Take 1-2 tablets (5-10 mg) by mouth every 4 hours as needed for breakthrough pain  Qty: 30 tablet, Refills: 0    Associated Diagnoses: History of total right hip replacement      rivaroxaban ANTICOAGULANT (XARELTO) 10 MG TABS tablet Take 1 tablet (10 mg) by mouth daily  Qty: 30 tablet, Refills: 0    Associated Diagnoses: History of total right hip replacement      senna-docusate (SENOKOT-S/PERICOLACE) 8.6-50 MG tablet Take 1 tablet by mouth 2 times daily  Qty: 30 tablet, Refills: 0    Associated Diagnoses: History of total right hip replacement       !! - Potential duplicate medications found. Please discuss with provider.      CONTINUE these medications which have NOT CHANGED    Details   amoxicillin (AMOXIL) 500 MG tablet Take 2,000 mg by mouth daily as needed (1 hour prior to dental visits)      Ascorbic Acid (VITAMIN C) 500 MG CAPS Take 500 mg by mouth daily      busPIRone (BUSPAR) 7.5 MG tablet Take 7.5 mg by mouth every morning       Flaxseed, Linseed, (FLAXSEED OIL PO) Take 1,000 mg by mouth daily      FLUoxetine (PROZAC) 40 MG capsule Take 40 mg by mouth daily      hypromellose (ARTIFICIAL TEARS) 0.5 % SOLN ophthalmic solution Place 1 drop into both eyes every hour as needed for dry eyes      levothyroxine (SYNTHROID/LEVOTHROID) 75 MCG tablet Take 75 mcg by mouth daily      simvastatin (ZOCOR) 20 MG tablet Take 20 mg by mouth At Bedtime      traZODone  (DESYREL) 50 MG tablet Take 50 mg by mouth At Bedtime      Vitamin D3 (CHOLECALCIFEROL) 25 mcg (1000 units) tablet Take 1 tablet by mouth daily         STOP taking these medications       aspirin 81 MG EC tablet Comments:   Reason for Stopping:         ibuprofen (ADVIL/MOTRIN) 200 MG tablet Comments:   Reason for Stopping:             Code Status:   X-rays needed at the follow up visit:  Medical consult was placed due to sustained dizziness when working with therapy and also a fluid bolus was administered.    Víctor Hopkins PA-C  3/22/2021 9:33 AM   [facility]  [phone]

## 2021-03-22 NOTE — PLAN OF CARE
Ortho    Diagnosis: R total hip  POD#: 3  Mental Status: A & O x 4  Activity/dangle: A x 1 GB/W   Diet: Regular  Pain: Tylenol, atarax, and oxycodone given for pain.  Ria/Voiding: Voiding in BSC  Tele/Restraints/Iso: NA  02/LDA: PIV SL  D/C Date: Expected to discharge tomorrow depending on BP and dizziness improvement.  Other Info: CMS intact. Dressing CDI. PT following.

## 2021-03-22 NOTE — PROGRESS NOTES
"Nicole GARRETT Clarence  3/22/2021  POD # 3 s/p Right total hip replacement with abductor tendon repair  Admit Date:  3/19/2021      Doing well.  Clean wound without signs of infection.  No immediate surgical complications identified.  No excessive bleeding  Pain well-controlled.  Objective: patient states she feels better today than she did yesterday. Was a little dizzy this morning when working with therapy but says she hasn't eaten much and is working on drinking plenty of fluids. Willing to work with therapy again this afternoon before discharge home.   Blood pressure 125/70, pulse 67, temperature 99.8  F (37.7  C), temperature source Oral, resp. rate 16, height 1.753 m (5' 9\"), weight 77.6 kg (171 lb), SpO2 96 %.    Temperatures:  Current - Temp: 99.8  F (37.7  C); Max - Temp  Av.5  F (37.5  C)  Min: 98.2  F (36.8  C)  Max: 100.1  F (37.8  C)  Pulse range: Pulse  Av  Min: 64  Max: 67  Blood pressure range: Systolic (24hrs), Av , Min:103 , Max:125   ; Diastolic (24hrs), Av, Min:53, Max:70    Exam:  CMS: intact  alert, stable, wound ok  Dressing c/d/i  Calf s/nt  DF/PF   Communicates clearly with examiner  Abduction brace in place at all times    Labs:  No results for input(s): POTASSIUM in the last 63660 hours.  Recent Labs   Lab Test 21  0856 21  0745   HGB 9.5* 10.4*     No results for input(s): INR in the last 11003 hours.  No results for input(s): PLT in the last 96211 hours.    PLAN: WBAT in the RLE.  No hip abduction. Hip abduction brace at all times. Discharge for home today. Follow up with us in 2-3 weeks.     Patient after working with therapy again had orthostatic hypotension. She became dizzy. We gave her a fluid bolus and consulted the hospitalist. If she improves more today and feels like it she can discharge home this evening. If not, she can discharge home tomorrow morning.     "

## 2021-03-22 NOTE — PLAN OF CARE
OT: Attempted to see pt for OT session. Pt just got back in bed and receiving bolus of fluids. Will reschedule to tomorrow.   Candice Patel, OT on 3/22/2021 at 2:40 PM

## 2021-03-22 NOTE — CONSULTS
[facility]  Consult Note - Hospitalist Service     Date of Admission:  3/19/2021  Consult Requested by: Víctor Hopkins PA-C  Reason for Consult: Symptomatic hypotension  PRIMARY CARE PROVIDER:    Nia Martinez    Assessment & Plan   Nicole Lima is a 75 year old female admitted on 3/19/2021.    Past medical history significant for OA, DDD, HLP, Hypothyroidism, MDD, CHRISTOPHER, Insomnia, GERD, Ascending aorta dilation, History of DVT who is POD #3 from an elective right JABIER.  Hospitalist service was consulted regarding symptomatic orthostatic hypotension.    Symptomatic orthostatic hypotension  Notably low blood pressures over the weekend with complaints of dizziness.  The morning of consult patient was unable to work with therapies as blood pressure systolic dropped into the 80's and patient again had complaints of dizziness.    *S/p 1 L IVF bolus.    - STAT CBC with differential.    - STAT BMP.    - IV Fluids restarted at 100 ml/hr.    - Check orthostatic blood pressures.      Acute blood loss anemia  HGB pre-op at 13 and today (3/22) at 8.9.    - Defer transfusion to Ortho.    - Check HGB tonight at 9PM and tomorrow morning.      Fever  Patient with temperature of 100.9 3/21/2021.  Continued low grade fevers on day of consult.    - STAT CBC with differential.    - Monitor.  - Encourage IS.      - Check UA.      OA with degenerative changes of the right hip s/p right JABIER  DDD (lumbar) with chronic back pain  Postoperative day 3.   - Orthopedic Service is managing.   --Defer analgesic management, DVT prophylaxis, PT/OT.     --Defer resumption of ASA 81 mg/d-->Hold as patient was started on Xarelto 10 mg/d.   - Encourage utilization of incentive spirometer.   - Start APAP 1000 mg TID.      HLP  - Resume PTA Zocor 20 mg at bedtime.      Hypothyroidism  - Resume PTA Levothyroxine 75 mcg/d.      MDD  CHRISTOPHER  Insomnia  - Resume PTA Buspar 7.5 mg TID, Prozac 30 mg/d.    - Resume PTA PRN Trazodone 50  mg.    GERD  No interventions.      Ascending aorta dilation  Outpatient follow-up.      Dry eye syndrome  - Resume PTA eye drops.      History of DVT/PE  Not on chronic anticoagulation.      Diet: Advance Diet as Tolerated: Regular Diet Adult  Discharge Instruction - Regular Diet Adult     DVT Prophylaxis: Pneumatic Compression Devices and Xarelto   Rai Catheter: not present  Code Status: Full Code     Disposition Plan    Expected discharge: Per Ortho; likely tomorrow 3/23 if orthostatic blood pressures have resolved.    Entered: Alan Junior PA-C 03/22/2021, 2:49 PM        The patient's care was discussed with the Patient.    The patient has been discussed with Dr. Patel, who agrees with the assessment and plan at this time. Dr. Patel will evaluate the patient independently.     At this time, I'd like to thank Víctor Hopkins PA-C for consulting the Hospitalist service.  We will follow.        Alan Junior PA-C      ______________________________________________________________________    Chief Complaint   Elective right JABIER.  Hospitalist service consulted regarding symptomatic orthostatic hypotension.      History is obtained from the patient and EMR.      History of Present Illness   Nicole Lima is a 75 year old female with a past medical history significant for OA, DDD, HLP, Hypothyroidism, MDD, CHRISTOPHER, Insomnia, GERD, Ascending aorta dilation, History of DVT who is POD #3 from an elective right JABIER.  Hospitalist service was consulted regarding symptomatic orthostatic hypotension.    Patient is post-op day #3 from Right JABIER.  Surgery was performed under general anesthesia.  EBL was less than 100 ml.      Patient was seen in her hospital room.  She indicated that she developed slight chills with a fever and night sweat yesterday.  She has also been feeling lightheaded and slightly dizzy when standing up and when working with physical therapy.  This  lightheadedness/dizziness is accompanied with some nausea and she stated that when it occurs all she wants to do is sit back down.      She normally utilizes hearing aides but does not have them here.  She occasionally has some swelling in her legs.  She described having urinary frequency and sudden urges to urinate.  She stated that this was not new for her and has not worsened since admission.  She occasionally becomes short of breath but this occurs because of anxiety.  Patient has not had a bowel movement since Thursday but is passing flatus.      Reviewed history of DVT/PE.  She had PE bilaterally about 10 years ago.  Reviewed plan and close monitoring.      Review of Systems   The 10 point Review of Systems is negative other than noted in the HPI.    Past Medical History    I have reviewed this patient's medical history and updated it with pertinent information if needed.   Past Medical History:   Diagnosis Date     Anxiety      Arthritis     primary OA bilat hip     Ascending aorta dilatation (H)      Chronic low back pain      Degenerative lumbar disc      Depression, major, recurrent, in partial remission (H)      Dermatochalasis of both lower eyelids     and left upper eyelid     Diverticulosis      DVT (deep venous thrombosis) (H)      Dyspnea     multifactorial     Enthesopathy of hip     right     GERD (gastroesophageal reflux disease)      Hyperlipidemia      Hypothyroidism      Insomnia      Interstitial cystitis      Keratosis, actinic      Macrocytosis      Myalgia      Plantar neuroma of left foot      PONV (postoperative nausea and vomiting)      Pulmonary embolus (H)      Sensorineural hearing loss      Tendinopathy of left gluteus medius      Trochanteric bursitis, right hip      Venous insufficiency (chronic) (peripheral)    OA, DDD, HLP, Hypothyroidism, MDD, CHRISTOPHER, Insomnia, GERD, Ascending aorta dilation, History of DVT    Past Surgical History   I have reviewed this patient's surgical history  and updated it with pertinent information if needed.  Past Surgical History:   Procedure Laterality Date     ABDOMEN SURGERY  09/19/2003    laparoscopy with fulguration r excision of lesions of the ovary, pelvic viscera or peritoneal surface     APPENDECTOMY  11/12/1965     BREAST SURGERY  12/2006    biopsy x 3     COLONOSCOPY  03/24/2014    with poypectomy     ENT SURGERY  05/15/2017    reduction of nasal turbinate     ENT SURGERY  1950    tonisllectomy     EYE SURGERY      intraocular lens implant     EYE SURGERY  05/16/2018    blepharoplasty     GENITOURINARY SURGERY  1978, 1998    bladder repair     GYN SURGERY  11/12/1978    hysterectomy, salpingo oopherectomy     HERNIA REPAIR  1975    umbilical     ORTHOPEDIC SURGERY Left 08/21/2019    JABIER     ORTHOPEDIC SURGERY  03/09/2018    excision of berman's neroma     ORTHOPEDIC SURGERY  10/20/2017    exostectomy     ORTHOPEDIC SURGERY  08/24/2004    excision of berman's neuroma, correctin of hallux valgus     ORTHOPEDIC SURGERY  11/1996    excion of berman's neuroma     ORTHOPEDIC SURGERY  10/2012    excision of berman's neuroma     ORTHOPEDIC SURGERY  03/09/2018    removal of hardware second metatarsal     ORTHOPEDIC SURGERY  04/07/2013    hammer toe correction     ORTHOPEDIC SURGERY  11/17/2011    arthroplasty of wrist and hand     ORTHOPEDIC SURGERY  10/19/2012    repair of joint capsule     ORTHOPEDIC SURGERY  04/05/2013    bunionectomy     ORTHOPEDIC SURGERY  11/12/1994    repair of hallux valgus     ORTHOPEDIC SURGERY  10/20/2017    chellectomy of metatarsal       Social History   I have reviewed this patient's social history and updated it with pertinent information if needed.  Patient resides in a house with her .  Patient has never smoked.  She consumes alcohol, maybe once a week.  She does not use illicit drugs.  She does not use a cane or walker.    Social History     Tobacco Use     Smoking status: Never Smoker     Smokeless tobacco: Never Used    Substance Use Topics     Alcohol use: Yes     Comment: couple drinks a week     Drug use: None       Family History   I have reviewed this patient's family history and updated it with pertinent information if needed.   History reviewed. No pertinent family history.   Mother: Breast Cancer, Heart disease with heart failure.  Father: Heart disease, prostate cancer, MDD.    Brother: Alcohol abuse with liver disease, HTN and HLP.      Medications   Prior to Admission Medications   Prescriptions Last Dose Informant Patient Reported? Taking?   Ascorbic Acid (VITAMIN C) 500 MG CAPS 3/10/2021 Self Yes Yes   Sig: Take 500 mg by mouth daily   FLUoxetine (PROZAC) 40 MG capsule 3/18/2021 at 1200 Self Yes Yes   Sig: Take 40 mg by mouth daily   Flaxseed, Linseed, (FLAXSEED OIL PO) 3/10/2021 Self Yes Yes   Sig: Take 1,000 mg by mouth daily   Vitamin D3 (CHOLECALCIFEROL) 25 mcg (1000 units) tablet 3/10/2021 Self Yes Yes   Sig: Take 1 tablet by mouth daily   amoxicillin (AMOXIL) 500 MG tablet MORE THAN 1 WEEK at PRN Self Yes Yes   Sig: Take 2,000 mg by mouth daily as needed (1 hour prior to dental visits)   aspirin 81 MG EC tablet 3/10/2021 Self Yes No   Sig: Take 81 mg by mouth daily   busPIRone (BUSPAR) 7.5 MG tablet 3/19/2021 at AM Self Yes Yes   Sig: Take 7.5 mg by mouth every morning    hypromellose (ARTIFICIAL TEARS) 0.5 % SOLN ophthalmic solution  at PRN Self Yes Yes   Sig: Place 1 drop into both eyes every hour as needed for dry eyes   ibuprofen (ADVIL/MOTRIN) 200 MG tablet MORE THAN 1 WEEK at PRN Self Yes No   Sig: Take 200 mg by mouth every 4 hours as needed for mild pain   levothyroxine (SYNTHROID/LEVOTHROID) 75 MCG tablet 3/19/2021 at AM Self Yes Yes   Sig: Take 75 mcg by mouth daily   simvastatin (ZOCOR) 20 MG tablet 3/18/2021 at HS Self Yes Yes   Sig: Take 20 mg by mouth At Bedtime   traZODone (DESYREL) 50 MG tablet 3/18/2021 at HS Self Yes Yes   Sig: Take 50 mg by mouth At Bedtime      Facility-Administered  Medications: None     Allergies   No Known Allergies    Physical Exam   Temp: 99.2  F (37.3  C) Temp src: Oral BP: 117/67 Pulse: 60   Resp: 16 SpO2: 94 % O2 Device: None (Room air)      Constitutional: Awake, alert, cooperative, no apparent distress.   ENT: Normocephalic, without obvious abnormality, atraumatic, oral pharynx with moist mucus membranes, tonsils without erythema or exudates.  Eyes pupils are equal, round and reactive to light; extra occular movements intact.  Normal sclera.    Neck: Supple, symmetrical, trachea midline, no adenopathy.  Pulmonary: No increased work of breathing, good air exchange, clear to auscultation bilaterally, no crackles or wheezing.  Cardiovascular: Regular rate and rhythm, normal S1 and S2, no S3 or S4, and no murmur noted.  GI: Normal bowel sounds, soft, non-distended, non-tender.    Skin/Integumen: Clear.  Neuro: CN II-XII grossly intact.  Upper and lower extremities strength, coordination and sensation intact bilaterally.    Psych:  Alert and oriented x 3. Normal affect.  Extremities: Trace lower extremity edema noted, and calves are non-tender to palpation bilaterally.     Data   I personally reviewed no images or EKG's today.  Results for orders placed or performed during the hospital encounter of 03/19/21 (from the past 24 hour(s))   Basic metabolic panel   Result Value Ref Range    Sodium 139 133 - 144 mmol/L    Potassium 4.5 3.4 - 5.3 mmol/L    Chloride 108 94 - 109 mmol/L    Carbon Dioxide 27 20 - 32 mmol/L    Anion Gap 4 3 - 14 mmol/L    Glucose 88 70 - 99 mg/dL    Urea Nitrogen 17 7 - 30 mg/dL    Creatinine 0.63 0.52 - 1.04 mg/dL    GFR Estimate 88 >60 mL/min/[1.73_m2]    GFR Estimate If Black >90 >60 mL/min/[1.73_m2]    Calcium 7.8 (L) 8.5 - 10.1 mg/dL   CBC with platelets differential   Result Value Ref Range    WBC 5.8 4.0 - 11.0 10e9/L    RBC Count 2.75 (L) 3.8 - 5.2 10e12/L    Hemoglobin 8.9 (L) 11.7 - 15.7 g/dL    Hematocrit 27.8 (L) 35.0 - 47.0 %      (H) 78 - 100 fl    MCH 32.4 26.5 - 33.0 pg    MCHC 32.0 31.5 - 36.5 g/dL    RDW 13.3 10.0 - 15.0 %    Platelet Count 164 150 - 450 10e9/L    Diff Method Automated Method     % Neutrophils 59.1 %    % Lymphocytes 24.6 %    % Monocytes 13.3 %    % Eosinophils 2.4 %    % Basophils 0.3 %    % Immature Granulocytes 0.3 %    Nucleated RBCs 0 0 /100    Absolute Neutrophil 3.4 1.6 - 8.3 10e9/L    Absolute Lymphocytes 1.4 0.8 - 5.3 10e9/L    Absolute Monocytes 0.8 0.0 - 1.3 10e9/L    Absolute Eosinophils 0.1 0.0 - 0.7 10e9/L    Absolute Basophils 0.0 0.0 - 0.2 10e9/L    Abs Immature Granulocytes 0.0 0 - 0.4 10e9/L    Absolute Nucleated RBC 0.0

## 2021-03-23 ENCOUNTER — APPOINTMENT (OUTPATIENT)
Dept: PHYSICAL THERAPY | Facility: CLINIC | Age: 76
DRG: 470 | End: 2021-03-23
Attending: ORTHOPAEDIC SURGERY
Payer: MEDICARE

## 2021-03-23 ENCOUNTER — APPOINTMENT (OUTPATIENT)
Dept: OCCUPATIONAL THERAPY | Facility: CLINIC | Age: 76
DRG: 470 | End: 2021-03-23
Attending: ORTHOPAEDIC SURGERY
Payer: MEDICARE

## 2021-03-23 VITALS
OXYGEN SATURATION: 95 % | HEIGHT: 69 IN | WEIGHT: 171 LBS | DIASTOLIC BLOOD PRESSURE: 73 MMHG | RESPIRATION RATE: 16 BRPM | BODY MASS INDEX: 25.33 KG/M2 | SYSTOLIC BLOOD PRESSURE: 124 MMHG | HEART RATE: 61 BPM | TEMPERATURE: 97.7 F

## 2021-03-23 LAB — HGB BLD-MCNC: 9.9 G/DL (ref 11.7–15.7)

## 2021-03-23 PROCEDURE — 250N000013 HC RX MED GY IP 250 OP 250 PS 637: Performed by: PHYSICIAN ASSISTANT

## 2021-03-23 PROCEDURE — 36415 COLL VENOUS BLD VENIPUNCTURE: CPT | Performed by: PHYSICIAN ASSISTANT

## 2021-03-23 PROCEDURE — 97530 THERAPEUTIC ACTIVITIES: CPT | Mod: GP | Performed by: PHYSICAL THERAPY ASSISTANT

## 2021-03-23 PROCEDURE — 97110 THERAPEUTIC EXERCISES: CPT | Mod: GP | Performed by: PHYSICAL THERAPY ASSISTANT

## 2021-03-23 PROCEDURE — 258N000003 HC RX IP 258 OP 636: Performed by: PHYSICIAN ASSISTANT

## 2021-03-23 PROCEDURE — 97535 SELF CARE MNGMENT TRAINING: CPT | Mod: GO

## 2021-03-23 PROCEDURE — 85018 HEMOGLOBIN: CPT | Performed by: PHYSICIAN ASSISTANT

## 2021-03-23 PROCEDURE — 97116 GAIT TRAINING THERAPY: CPT | Mod: GP | Performed by: PHYSICAL THERAPY ASSISTANT

## 2021-03-23 PROCEDURE — 99232 SBSQ HOSP IP/OBS MODERATE 35: CPT | Performed by: INTERNAL MEDICINE

## 2021-03-23 RX ORDER — FERROUS SULFATE 325(65) MG
325 TABLET ORAL 2 TIMES DAILY WITH MEALS
Qty: 60 TABLET | Refills: 0 | Status: SHIPPED | OUTPATIENT
Start: 2021-03-23

## 2021-03-23 RX ADMIN — RIVAROXABAN 10 MG: 10 TABLET, FILM COATED ORAL at 08:29

## 2021-03-23 RX ADMIN — Medication 25 MCG: at 08:30

## 2021-03-23 RX ADMIN — SENNOSIDES AND DOCUSATE SODIUM 1 TABLET: 8.6; 5 TABLET ORAL at 08:30

## 2021-03-23 RX ADMIN — FLUOXETINE 40 MG: 20 CAPSULE ORAL at 08:29

## 2021-03-23 RX ADMIN — OXYCODONE HYDROCHLORIDE 5 MG: 5 TABLET ORAL at 12:18

## 2021-03-23 RX ADMIN — BUSPIRONE HYDROCHLORIDE 7.5 MG: 5 TABLET ORAL at 08:29

## 2021-03-23 RX ADMIN — LEVOTHYROXINE SODIUM 75 MCG: 75 TABLET ORAL at 07:18

## 2021-03-23 RX ADMIN — DOCUSATE SODIUM 100 MG: 100 CAPSULE, LIQUID FILLED ORAL at 08:30

## 2021-03-23 RX ADMIN — OXYCODONE HYDROCHLORIDE 5 MG: 5 TABLET ORAL at 03:03

## 2021-03-23 RX ADMIN — ACETAMINOPHEN 1000 MG: 500 TABLET, FILM COATED ORAL at 08:30

## 2021-03-23 RX ADMIN — HYDROXYZINE HYDROCHLORIDE 10 MG: 10 TABLET, FILM COATED ORAL at 08:30

## 2021-03-23 RX ADMIN — SODIUM CHLORIDE: 9 INJECTION, SOLUTION INTRAVENOUS at 02:46

## 2021-03-23 RX ADMIN — HYDROXYZINE HYDROCHLORIDE 10 MG: 10 TABLET, FILM COATED ORAL at 02:37

## 2021-03-23 ASSESSMENT — ACTIVITIES OF DAILY LIVING (ADL)
ADLS_ACUITY_SCORE: 18

## 2021-03-23 NOTE — PLAN OF CARE
Reviewed discharge instructions and medications with patient. Questions answered. Patient discharged to home with spouse, discharge instructions, medications, and belongings at this time.

## 2021-03-23 NOTE — PLAN OF CARE
Patient vital signs are at baseline: Yes  Patient able to ambulate as they were prior to admission or with assist devices provided by therapies during their stay:  Yes  Patient MUST void prior to discharge:  Yes  Patient able to tolerate oral intake:  Yes  Pain has adequate pain control using Oral analgesics:  Yes     AOx4. VSS on RA. A1 w/ GB/Walker - ambulated in room this shift. Voids adequately using bed pan. Pain controlled by atarax, tylenol, oxycodone.

## 2021-03-23 NOTE — PROGRESS NOTES
"[facility]    Medicine Progress Note - Hospitalist Service       Date of Admission:  3/19/2021    Assessment & Plan   Nicole Lima is a 75 year old female with hx of hypothyroidism, dyslipidemia, and depression/anxiety who was admitted to the Orthopedic Surgery service on 3/19/2021 for elective right JABIER. Hospitalist service was consulted for post-op orthostatic hypotension    1. Orthostatic hypotension likely due to blood loss  2. Acute blood loss anemia due to surgery  3. Right hip OA s/p right JABIER on 3/19/2021  4. History of DVT/PE, no longer on anticoagulation  5. Dilated ascending aorta  6. Hyperlipidemia  7. Hypothyroidism  8. Lumbar DDD with chronic low back pain  9. Major recurrent depressive disorder with anxiety    Blood pressure and symptoms improved with IV fluids. Hgb ziggy 8.6 (baseline 13), 9.9 today - 3/23  - Start iron tabs BID wm at discharge  - Continues on other PTA meds  - Follow up with PCP in 2-3 weeks for follow up Hgb     Diet: Advance Diet as Tolerated: Regular Diet Adult  Discharge Instruction - Regular Diet Adult    DVT Prophylaxis: Rivaroxaban as per Ortho  Rai Catheter: not present  Code Status: Full Code           Disposition: Expected discharge as per Ortho. OK for medical standpoint    The patient's care was discussed with the Patient.    Linda Silva MD  Hospitalist Service  [facility]  Contact information available via [facility] Paging/Directory    ______________________________________________________________________    Interval History   Blood pressure improved this morning. States she feels \"much better.\" Denies cp/sob, dizziness/lightheadedness.     Data reviewed today: I reviewed all medications, new labs and imaging results over the last 24 hours. I personally reviewed no images or EKG's today.    Physical Exam   Vital Signs: Temp: 97.7  F (36.5  C) Temp src: Axillary BP: 124/73 Pulse: 61   Resp: 16 SpO2: 95 % O2 " Device: None (Room air)    Weight: 171 lbs 0 oz  Constitutional: Resting comfortably, NAD  HEENT: Sclera white, MMM  Respiratory: Breathing non-labored. Lungs CTAB - no wheezes, crackles, or rhonchi  Cardiovascular: Heart RRR, no m/r/g. No pedal edema  GI: +BS, abd soft/NT  Skin/Integument: No rash  Musculoskeletal: Normal muscle bulk and tone  Neuro: Alert and appropriate, [place]  Psych: Calm and cooperative    Data   Recent Labs   Lab 03/23/21  0655 03/22/21  2111 03/22/21  1506 03/21/21  0856 03/20/21  0745   WBC  --   --  5.8  --   --    HGB 9.9* 8.6* 8.9* 9.5* 10.4*   MCV  --   --  101*  --   --    PLT  --   --  164  --   --    NA  --   --  139  --   --    POTASSIUM  --   --  4.5  --   --    CHLORIDE  --   --  108  --   --    CO2  --   --  27  --   --    BUN  --   --  17  --   --    CR  --   --  0.63  --  0.69   ANIONGAP  --   --  4  --   --    CULLEN  --   --  7.8*  --   --    GLC  --   --  88 89  --          No results found for this or any previous visit (from the past 24 hour(s)).    Medications       acetaminophen  1,000 mg Oral TID     busPIRone  7.5 mg Oral QAM     docusate sodium  100 mg Oral BID     FLUoxetine  40 mg Oral Daily     levothyroxine  75 mcg Oral QAM AC     polyethylene glycol  17 g Oral Daily     rivaroxaban ANTICOAGULANT  10 mg Oral Daily     senna-docusate  1 tablet Oral BID     simvastatin  20 mg Oral At Bedtime     traZODone  50 mg Oral At Bedtime     Vitamin D3  25 mcg Oral Daily

## 2021-03-23 NOTE — PLAN OF CARE
Patient vital signs are at baseline: No,  Reason: Orthostatic hypotension w/ dizzyness  Patient able to ambulate as they were prior to admission or with assist devices provided by therapies during their stay:  No,  Reason:  Orthostatic hypotension, unable to perform P.T.  Patient MUST void prior to discharge:  Yes  Patient able to tolerate oral intake:  Yes  Pain has adequate pain control using Oral analgesics:  Yes Pain controlled with scheduled tylenol and oxycodone 5mg PO.    A/ O x 4. JULIANNA NaCl at 100 ml/hr. A1 to bedside commode. Orthostatic hypotension. HGB 8.9->8.6, recheck in morning. Adequate intake and output. Pain controlled with sched. Tylenol, atarax, and oxycodone. BM x 1  on shift. UA collected. Continue to monitor.

## 2021-03-23 NOTE — PLAN OF CARE
Occupational Therapy Discharge Summary    Reason for therapy discharge:    Discharged to home.    Progress towards therapy goal(s). See goals on Care Plan in [facility] electronic health record for goal details.  Goals partially met.  Barriers to achieving goals:   discharge from facility.    Therapy recommendation(s):    Home w/spouse A as needed for ADLs and household tasks

## 2021-03-25 ENCOUNTER — NURSE TRIAGE (OUTPATIENT)
Dept: NURSING | Facility: CLINIC | Age: 76
End: 2021-03-25

## 2021-03-25 NOTE — TELEPHONE ENCOUNTER
Patient calling. She is reporting  She was recently hospitalized , and received a new hip.  She has had ongoing urinary frequency in the hospital , and continues at home since her discharge.,  She had a UA in the hospital, and result in the chart were negative..  She was discontinue's on 03/23/2021.  She reports she cannot make it to the toilet without urinating , and usually fills her pad in her underwear.  She reports getting fup to urinate 6-7 times last night to urinate on the commode beside her bed.     Patient reports she has a direct phone number to her PCP, and will call her . She was offered a telephone appointment   With her PCP, and she declined .    Chanelle Mckeon RN on 3/25/2021 at 7:53 AM         Additional Information    Negative urine test (i.e., LE - and WBC < 10) and urine symptoms continue or are worsening    Protocols used: URINALYSIS RESULTS FOLLOW-UP CALL-A-[place]

## 2021-04-15 ENCOUNTER — DOCUMENTATION ONLY (OUTPATIENT)
Dept: OTHER | Facility: CLINIC | Age: 76
End: 2021-04-15

## 2021-06-14 NOTE — PLAN OF CARE
Pt a/o x 4. VSS on RA. WBAT. Home brace in place to rle. A2 pivot to chair. Oxycodone and atarax for pain. Adequate I/O. SL. PT x 1 w/ dizziness and muscle spasms. Continue to monitor.   stated

## (undated) DEVICE — DRSG ABDOMINAL 07 1/2X8" 7197D

## (undated) DEVICE — DRSG TEGADERM 2 1/2X 2 3/4"

## (undated) DEVICE — DRSG ADAPTIC 3X8" 6113

## (undated) DEVICE — SU MONOCRYL 3-0 PS-2 27" Y427H

## (undated) DEVICE — PAD FOAM MCGUIRE KIT 0814-0150

## (undated) DEVICE — SU ETHIBOND 1 CT-1 30" X425H

## (undated) DEVICE — DRSG XEROFORM 1X8"

## (undated) DEVICE — SOL ADH LIQUID BENZOIN SWAB 0.6ML C1544

## (undated) DEVICE — PREP CHLORAPREP 26ML TINTED ORANGE  260815

## (undated) DEVICE — ESU GROUND PAD UNIVERSAL W/O CORD

## (undated) DEVICE — DRSG STERI STRIP 1/4X3" R1541

## (undated) DEVICE — SYR EAR BULB 3OZ 0035830

## (undated) DEVICE — SU FIBERWIRE 2 38"  AR-7200

## (undated) DEVICE — DRSG ADAPTIC 3X3" 6112

## (undated) DEVICE — SOL NACL 0.9% IRRIG 1000ML BOTTLE 2F7124

## (undated) DEVICE — SU STRATAFIX PDS PLUS 0 CT 45CM SXPP1A406

## (undated) DEVICE — SU ETHILON 3-0 FS-1 18" 669H

## (undated) DEVICE — SU ETHIBOND 0 CTX CR  8X18" CX31D

## (undated) DEVICE — DRSG GAUZE 4X4" 2187

## (undated) DEVICE — SOL WATER IRRIG 1000ML BOTTLE 2F7114

## (undated) DEVICE — GLOVE PROTEXIS BLUE W/NEU-THERA 8.0  2D73EB80

## (undated) DEVICE — LINEN TOWEL PACK X5 5464

## (undated) DEVICE — BONE WAX 2.5GM W31G

## (undated) DEVICE — SYR 30ML LL W/O NDL 302832

## (undated) DEVICE — SUCTION IRR SYSTEM W/O TIP INTERPULSE HANDPIECE 0210-100-000

## (undated) DEVICE — MANIFOLD NEPTUNE 4 PORT 700-20

## (undated) DEVICE — SU ETHIBOND 0 CT-1 CR 8X18" CX21D

## (undated) DEVICE — NDL 18GA 1.5" 305196

## (undated) DEVICE — BLADE SAW SAGITTAL STRK 18X90X1.27MM HD SYS 6 6118-127-090

## (undated) DEVICE — SOLUTION WOUND CLEANSING 3/4OZ 10% PVP EA-L3011FB-50

## (undated) DEVICE — GLOVE PROTEXIS W/NEU-THERA 8.0  2D73TE80

## (undated) DEVICE — SU VICRYL 2-0 CT-1 27" J339H

## (undated) DEVICE — DRAPE LAP W/ARMBOARD 29410

## (undated) DEVICE — GOWN XXLG REINFORCED 9071EL

## (undated) DEVICE — SOL NACL 0.9% IRRIG 3000ML BAG 2B7477

## (undated) DEVICE — GLOVE PROTEXIS BLUE W/NEU-THERA 8.5  2D73EB85

## (undated) DEVICE — DRAPE IOBAN INCISE 23X17" 6650EZ

## (undated) DEVICE — PACK TOTAL HIP W/POUCH SOP15HPFSM

## (undated) DEVICE — DEVICE PASSER SM

## (undated) DEVICE — SU VICRYL 2-0 CP-2 27" UND J869H

## (undated) DEVICE — SU ETHIBOND 2 V-37 4X30" MX69G

## (undated) DEVICE — IMM PILLOW ABDUCT HIP MED 31143061

## (undated) DEVICE — PREP DURAPREP 26ML APL 8630

## (undated) DEVICE — GLOVE PROTEXIS MICRO 8.0  2D73PM80

## (undated) DEVICE — SU VICRYL 0 CT-1 27" J340H

## (undated) DEVICE — PACK TOTAL HIP W/U DRAPE RIVERSIDE LATEX FREE

## (undated) DEVICE — CLOSURE SYS SKIN PREMIERPRO EXOFIN FUSION 4X22CM STRL 3472

## (undated) RX ORDER — PROPOFOL 10 MG/ML
INJECTION, EMULSION INTRAVENOUS
Status: DISPENSED
Start: 2021-03-19

## (undated) RX ORDER — DEXAMETHASONE SODIUM PHOSPHATE 4 MG/ML
INJECTION, SOLUTION INTRA-ARTICULAR; INTRALESIONAL; INTRAMUSCULAR; INTRAVENOUS; SOFT TISSUE
Status: DISPENSED
Start: 2021-03-19

## (undated) RX ORDER — HYDROMORPHONE HYDROCHLORIDE 1 MG/ML
INJECTION, SOLUTION INTRAMUSCULAR; INTRAVENOUS; SUBCUTANEOUS
Status: DISPENSED
Start: 2021-03-19

## (undated) RX ORDER — FENTANYL CITRATE 50 UG/ML
INJECTION, SOLUTION INTRAMUSCULAR; INTRAVENOUS
Status: DISPENSED
Start: 2021-03-19

## (undated) RX ORDER — LIDOCAINE HYDROCHLORIDE 20 MG/ML
INJECTION, SOLUTION EPIDURAL; INFILTRATION; INTRACAUDAL; PERINEURAL
Status: DISPENSED
Start: 2021-03-19

## (undated) RX ORDER — ONDANSETRON 2 MG/ML
INJECTION INTRAMUSCULAR; INTRAVENOUS
Status: DISPENSED
Start: 2021-03-19

## (undated) RX ORDER — CEFAZOLIN SODIUM 1 G/3ML
INJECTION, POWDER, FOR SOLUTION INTRAMUSCULAR; INTRAVENOUS
Status: DISPENSED
Start: 2021-03-19